# Patient Record
Sex: FEMALE | Race: BLACK OR AFRICAN AMERICAN | Employment: UNEMPLOYED | URBAN - METROPOLITAN AREA
[De-identification: names, ages, dates, MRNs, and addresses within clinical notes are randomized per-mention and may not be internally consistent; named-entity substitution may affect disease eponyms.]

---

## 2017-01-16 ENCOUNTER — OFFICE VISIT (OUTPATIENT)
Dept: FAMILY MEDICINE | Facility: CLINIC | Age: 62
End: 2017-01-16
Payer: COMMERCIAL

## 2017-01-16 VITALS
DIASTOLIC BLOOD PRESSURE: 82 MMHG | HEIGHT: 66 IN | HEART RATE: 75 BPM | BODY MASS INDEX: 25.78 KG/M2 | TEMPERATURE: 97.1 F | WEIGHT: 160.4 LBS | OXYGEN SATURATION: 100 % | SYSTOLIC BLOOD PRESSURE: 130 MMHG

## 2017-01-16 DIAGNOSIS — H53.9 VISUAL DISTURBANCE: ICD-10-CM

## 2017-01-16 DIAGNOSIS — J06.9 UPPER RESPIRATORY TRACT INFECTION, UNSPECIFIED TYPE: Primary | ICD-10-CM

## 2017-01-16 DIAGNOSIS — I10 HYPERTENSION GOAL BP (BLOOD PRESSURE) < 150/90: ICD-10-CM

## 2017-01-16 DIAGNOSIS — Z12.31 VISIT FOR SCREENING MAMMOGRAM: ICD-10-CM

## 2017-01-16 DIAGNOSIS — E55.9 VITAMIN D DEFICIENCY: ICD-10-CM

## 2017-01-16 DIAGNOSIS — R31.29 MICROSCOPIC HEMATURIA: ICD-10-CM

## 2017-01-16 DIAGNOSIS — R68.89 HEAT INTOLERANCE: ICD-10-CM

## 2017-01-16 DIAGNOSIS — M17.0 PRIMARY OSTEOARTHRITIS OF BOTH KNEES: ICD-10-CM

## 2017-01-16 LAB
ALBUMIN UR-MCNC: NEGATIVE MG/DL
ANION GAP SERPL CALCULATED.3IONS-SCNC: 11 MMOL/L (ref 3–14)
APPEARANCE UR: CLEAR
BACTERIA #/AREA URNS HPF: ABNORMAL /HPF
BASOPHILS # BLD AUTO: 0 10E9/L (ref 0–0.2)
BASOPHILS NFR BLD AUTO: 0.4 %
BILIRUB UR QL STRIP: NEGATIVE
BUN SERPL-MCNC: 15 MG/DL (ref 7–30)
CALCIUM SERPL-MCNC: 8.7 MG/DL (ref 8.5–10.1)
CHLORIDE SERPL-SCNC: 108 MMOL/L (ref 94–109)
CO2 SERPL-SCNC: 23 MMOL/L (ref 20–32)
COLOR UR AUTO: YELLOW
CREAT SERPL-MCNC: 0.61 MG/DL (ref 0.52–1.04)
DIFFERENTIAL METHOD BLD: NORMAL
EOSINOPHIL # BLD AUTO: 0.3 10E9/L (ref 0–0.7)
EOSINOPHIL NFR BLD AUTO: 5.1 %
ERYTHROCYTE [DISTWIDTH] IN BLOOD BY AUTOMATED COUNT: 12.7 % (ref 10–15)
GFR SERPL CREATININE-BSD FRML MDRD: ABNORMAL ML/MIN/1.7M2
GLUCOSE SERPL-MCNC: 102 MG/DL (ref 70–99)
GLUCOSE UR STRIP-MCNC: NEGATIVE MG/DL
HCT VFR BLD AUTO: 41.9 % (ref 35–47)
HGB BLD-MCNC: 13.4 G/DL (ref 11.7–15.7)
HGB UR QL STRIP: ABNORMAL
KETONES UR STRIP-MCNC: NEGATIVE MG/DL
LEUKOCYTE ESTERASE UR QL STRIP: NEGATIVE
LYMPHOCYTES # BLD AUTO: 2 10E9/L (ref 0.8–5.3)
LYMPHOCYTES NFR BLD AUTO: 37.5 %
MCH RBC QN AUTO: 29.6 PG (ref 26.5–33)
MCHC RBC AUTO-ENTMCNC: 32 G/DL (ref 31.5–36.5)
MCV RBC AUTO: 93 FL (ref 78–100)
MONOCYTES # BLD AUTO: 0.4 10E9/L (ref 0–1.3)
MONOCYTES NFR BLD AUTO: 7.5 %
NEUTROPHILS # BLD AUTO: 2.6 10E9/L (ref 1.6–8.3)
NEUTROPHILS NFR BLD AUTO: 49.5 %
NITRATE UR QL: NEGATIVE
NON-SQ EPI CELLS #/AREA URNS LPF: ABNORMAL /LPF
PH UR STRIP: 7 PH (ref 5–7)
PLATELET # BLD AUTO: 259 10E9/L (ref 150–450)
POTASSIUM SERPL-SCNC: 4.2 MMOL/L (ref 3.4–5.3)
RBC # BLD AUTO: 4.52 10E12/L (ref 3.8–5.2)
RBC #/AREA URNS AUTO: ABNORMAL /HPF (ref 0–2)
SODIUM SERPL-SCNC: 142 MMOL/L (ref 133–144)
SP GR UR STRIP: 1.01 (ref 1–1.03)
TSH SERPL DL<=0.005 MIU/L-ACNC: 2.61 MU/L (ref 0.4–4)
URN SPEC COLLECT METH UR: ABNORMAL
UROBILINOGEN UR STRIP-ACNC: 0.2 EU/DL (ref 0.2–1)
WBC # BLD AUTO: 5.3 10E9/L (ref 4–11)
WBC #/AREA URNS AUTO: ABNORMAL /HPF (ref 0–2)

## 2017-01-16 PROCEDURE — 36415 COLL VENOUS BLD VENIPUNCTURE: CPT | Performed by: FAMILY MEDICINE

## 2017-01-16 PROCEDURE — 99214 OFFICE O/P EST MOD 30 MIN: CPT | Performed by: FAMILY MEDICINE

## 2017-01-16 PROCEDURE — 85025 COMPLETE CBC W/AUTO DIFF WBC: CPT | Performed by: FAMILY MEDICINE

## 2017-01-16 PROCEDURE — 82306 VITAMIN D 25 HYDROXY: CPT | Performed by: FAMILY MEDICINE

## 2017-01-16 PROCEDURE — 80048 BASIC METABOLIC PNL TOTAL CA: CPT | Performed by: FAMILY MEDICINE

## 2017-01-16 PROCEDURE — 84443 ASSAY THYROID STIM HORMONE: CPT | Performed by: FAMILY MEDICINE

## 2017-01-16 PROCEDURE — 81001 URINALYSIS AUTO W/SCOPE: CPT | Performed by: FAMILY MEDICINE

## 2017-01-16 RX ORDER — HYDROCHLOROTHIAZIDE 25 MG/1
TABLET ORAL
Qty: 90 TABLET | Refills: 3 | Status: SHIPPED | OUTPATIENT
Start: 2017-01-16

## 2017-01-16 RX ORDER — ACETAMINOPHEN 500 MG
500 TABLET ORAL EVERY 6 HOURS PRN
Qty: 100 TABLET | Refills: 3 | Status: SHIPPED | OUTPATIENT
Start: 2017-01-16 | End: 2020-03-30

## 2017-01-16 NOTE — PATIENT INSTRUCTIONS
Call the imaging center at [phone] or  [phone] to schedule your CT.    [facility]    If you have any questions regarding to your visit please contact your care team:       Team Red:   Clinic Hours Telephone Number   Dr. Tayla Walden, NP   7am-7pm  Monday - Thursday   7am-5pm  Fridays  [phone]  (Appointment scheduling available 24/7)    Questions about your visit?   Team Line  [phone]   Urgent [facility] - [place] - 11am-9pm Monday-Friday Saturday-Sunday- 9am-5pm   [place] - 5pm-9pm Monday-Friday Saturday-Sunday- 9am-5pm  [phone] - [facility]  [phone] - [place]       What options do I have for visits at the clinic other than the traditional office visit?  To expand how we care for you, many of our providers are utilizing electronic visits (e-visits) and telephone visits, when medically appropriate, for interactions with their patients rather than a visit in the clinic.   We also offer nurse visits for many medical concerns. Just like any other service, we will bill your insurance company for this type of visit based on time spent on the phone with your provider. Not all insurance companies cover these visits. Please check with your medical insurance if this type of visit is covered. You will be responsible for any charges that are not paid by your insurance.      E-visits via [employer]:  generally incur a $35.00 fee.  Telephone visits:  Time spent on the phone: *charged based on time that is spent on the phone in increments of 10 minutes. Estimated cost:   5-10 mins $30.00   11-20 mins. $59.00   21-30 mins. $85.00     Use [employer] (secure email communication and access to your chart) to send your primary care provider a message or make an appointment. Ask someone on your Team how to sign up for [employer].  For a Price Quote for your services, please call our Consumer Price Line at  [phone].      As always, Thank you for trusting us with your health care needs!  Rigo Orosco

## 2017-01-16 NOTE — PROGRESS NOTES
SUBJECTIVE:                                                    Isa Stanton is a 62 year old female who presents to clinic today with her daughter for the following health issues:    Musculoskeletal problem/pain      Duration: comes and goes - greater than 1 year    Description  Location: bilateral knee pain and clicking    Intensity:  moderate    Accompanying signs and symptoms: none    History  Previous similar problem: YES  Previous evaluation:  x-ray    Precipitating or alleviating factors:  Trauma or overuse: no   Aggravating factors include: overuse, cold    Therapies tried and outcome: nothing     Eye Problem      Duration: gotten worse x months, unable to give me a date    Description (location/character/radiation): states she feels likes her eye sight has worsened    Intensity:  moderate    Accompanying signs and symptoms: headaches    History (similar episodes/previous evaluation): None    Precipitating or alleviating factors: light     Therapies tried and outcome: None     The patient complains of typical URI symptoms; coryza, nasal stuffiness, congestion, postnasal drip, sore throat, malaise, and dry cough, non-productive. She has sweats without fever or chills.     Hypertension well controlled on current medications without side effects, chest pain, or dyspnea. She would like her vitamins tested due to poor appetite and nutrition.     I have reviewed the patient's medical history in detail and updated the computerized patient record.     Due to language barrier, an  was present during the history-taking and subsequent discussion (and for part of the physical exam) with this patient.     ROS:  CONSTITUTIONAL: see above   I: NEGATIVE for worrisome rashes, moles or lesions  EYES: as above   ENT/MOUTH: as above   RESP:as above  CV: NEGATIVE for chest pain, palpitations or peripheral edema  GI: RUQ pain radiates to flank, off and on for several weeks   : NEGATIVE for frequency, dysuria, or  "hematuria  MUSCULOSKELETAL: see above   N: NEGATIVE for weakness, dizziness or paresthesias  ENDOCRINE: see above     OBJECTIVE:                                                    /82 mmHg  Pulse 75  Temp(Src) 97.1  F (36.2  C) (Oral)  Ht 5' 6\" (1.676 m)  Wt 160 lb 6.4 oz (72.757 kg)  BMI 25.90 kg/m2  SpO2 100%  Body mass index is 25.9 kg/(m^2).  GENERAL: alert and no distress  EYES: Eyes grossly normal to inspection, PERRL and conjunctivae and sclerae normal  HENT: ear canals and TM's normal, nose and mouth without ulcers or lesions  NECK: no adenopathy, no asymmetry, masses, or scars and thyroid normal to palpation  RESP: lungs clear to auscultation - no rales, rhonchi or wheezes  CV: regular rate and rhythm, normal S1 S2, no S3 or S4, no murmur, click or rub, no peripheral edema and peripheral pulses strong  ABDOMEN: soft, nontender, no hepatosplenomegaly, no masses and bowel sounds normal  MS: no gross musculoskeletal defects noted, no edema  PSYCH: mentation appears normal, affect normal/bright    Diagnostic Test Results:  Results for orders placed or performed in visit on 01/16/17   CBC with platelets differential   Result Value Ref Range    WBC 5.3 4.0 - 11.0 10e9/L    RBC Count 4.52 3.8 - 5.2 10e12/L    Hemoglobin 13.4 11.7 - 15.7 g/dL    Hematocrit 41.9 35.0 - 47.0 %    MCV 93 78 - 100 fl    MCH 29.6 26.5 - 33.0 pg    MCHC 32.0 31.5 - 36.5 g/dL    RDW 12.7 10.0 - 15.0 %    Platelet Count 259 150 - 450 10e9/L    Diff Method Automated Method     % Neutrophils 49.5 %    % Lymphocytes 37.5 %    % Monocytes 7.5 %    % Eosinophils 5.1 %    % Basophils 0.4 %    Absolute Neutrophil 2.6 1.6 - 8.3 10e9/L    Absolute Lymphocytes 2.0 0.8 - 5.3 10e9/L    Absolute Monocytes 0.4 0.0 - 1.3 10e9/L    Absolute Eosinophils 0.3 0.0 - 0.7 10e9/L    Absolute Basophils 0.0 0.0 - 0.2 10e9/L   UA reflex to Microscopic and Culture   Result Value Ref Range    Color Urine Yellow     Appearance Urine Clear     Glucose Urine " Negative NEG mg/dL    Bilirubin Urine Negative NEG    Ketones Urine Negative NEG mg/dL    Specific Gravity Urine 1.015 1.003 - 1.035    Blood Urine Trace (A) NEG    pH Urine 7.0 5.0 - 7.0 pH    Protein Albumin Urine Negative NEG mg/dL    Urobilinogen Urine 0.2 0.2 - 1.0 EU/dL    Nitrite Urine Negative NEG    Leukocyte Esterase Urine Negative NEG    Source Midstream Urine    Urine Microscopic   Result Value Ref Range    WBC Urine O - 2 0 - 2 /HPF    RBC Urine 2-5 (A) 0 - 2 /HPF    Squamous Epithelial /LPF Urine Moderate (A) FEW /LPF    Bacteria Urine Moderate (A) NEG /HPF        ASSESSMENT/PLAN:                                                    (J06.9) Upper respiratory tract infection, unspecified type  (primary encounter diagnosis)  Comment: educated how to take temperature at home   Plan: CBC with platelets differential, Urine         Microscopic        Symptomatic care.  Return to clinic for persistence, recurrence or new symptoms.     (M17.0) Primary osteoarthritis of both knees  Comment: mild by x-rays last year   Plan: acetaminophen (TYLENOL) 500 MG tablet        We discussed treatment options including tylenol as needed or orthopedic surgery referral.     (R68.89) Heat intolerance  Plan: CBC with platelets differential, TSH with free         T4 reflex        Reassurance provided.  Return to clinic for persistence, recurrence, or new symptoms as needed.     (H53.9) Visual disturbance  Plan: BASIC METABOLIC PANEL, OPHTHALMOLOGY ADULT         REFERRAL          (I10) Hypertension goal BP (blood pressure) < 150/90  Comment: Well controlled with medications without side effects.   Plan: BASIC METABOLIC PANEL, hydrochlorothiazide         (HYDRODIURIL) 25 MG tablet, TSH with free T4         reflex          (E55.9) Vitamin D deficiency  Plan: Vitamin D Deficiency        Resume supplement     (R31.29) Microscopic hematuria  Plan: UA reflex to Microscopic and Culture        CT per open orders     (Z12.31) Visit for  screening mammogram  Plan: MA SCREENING DIGITAL BILAT - Future  (s+30)            See Patient Instructions    Tayla Elena MD  [facility]

## 2017-01-16 NOTE — NURSING NOTE
"Chief Complaint   Patient presents with     Knee Pain     Eye Problem       Initial /82 mmHg  Pulse 75  Temp(Src) 97.1  F (36.2  C) (Oral)  Ht 5' 6\" (1.676 m)  Wt 160 lb 6.4 oz (72.757 kg)  BMI 25.90 kg/m2  SpO2 100% Estimated body mass index is 25.9 kg/(m^2) as calculated from the following:    Height as of this encounter: 5' 6\" (1.676 m).    Weight as of this encounter: 160 lb 6.4 oz (72.757 kg).  BP completed using cuff size: regular left arm    Kasey Gallego MA      "

## 2017-01-17 ENCOUNTER — TELEPHONE (OUTPATIENT)
Dept: FAMILY MEDICINE | Facility: CLINIC | Age: 62
End: 2017-01-17

## 2017-01-17 LAB — DEPRECATED CALCIDIOL+CALCIFEROL SERPL-MC: 13 UG/L (ref 20–75)

## 2017-01-17 RX ORDER — ERGOCALCIFEROL 1.25 MG/1
50000 CAPSULE, LIQUID FILLED ORAL
Qty: 8 CAPSULE | Refills: 0 | Status: SHIPPED
Start: 2017-01-17 | End: 2017-03-08

## 2017-01-17 NOTE — PROGRESS NOTES
Quick Note:    Please call patient via :  Your vitamin D level is low. Take Vitamin D 50,000 units weekly for 8 weeks (prescription has been sent to your pharmacy), then take over-the-counter vitamin D 2000 units daily indefinitely.     You have a small amount of blood in your urine. You do not have a urinary tract infection. Call the imaging center at [phone] or [phone] to schedule your CT.    Your thyroid, blood glucose, kidney, and anemia tests are normal. If you are not eating fruits and vegetables, take an over-the-counter multivitamin daily.     Tayla Elena MD    ______

## 2017-01-17 NOTE — TELEPHONE ENCOUNTER
Reason for Call: Request for an order or referral:    Order or referral being requested: Juan requesting a new order for CT scan for patient as the one ordered back in 2016  2016.    Date needed: as soon as possible    Has the patient been seen by the PCP for this problem? YES    Additional comments: Na  Phone number Patient can be reached at:  Home number on file [phone] (home) or Other phone number:      Best Time:  anytime    Can we leave a detailed message on this number?  YES    Call taken on 2017 at 4:44 PM by Casandra Villafuerte

## 2017-01-24 ENCOUNTER — OFFICE VISIT (OUTPATIENT)
Dept: OPHTHALMOLOGY | Facility: CLINIC | Age: 62
End: 2017-01-24
Payer: COMMERCIAL

## 2017-01-24 ENCOUNTER — RADIANT APPOINTMENT (OUTPATIENT)
Dept: MAMMOGRAPHY | Facility: CLINIC | Age: 62
End: 2017-01-24
Attending: FAMILY MEDICINE
Payer: COMMERCIAL

## 2017-01-24 DIAGNOSIS — Z12.31 VISIT FOR SCREENING MAMMOGRAM: ICD-10-CM

## 2017-01-24 DIAGNOSIS — H25.813 COMBINED FORM OF AGE-RELATED CATARACT, BOTH EYES: Primary | ICD-10-CM

## 2017-01-24 DIAGNOSIS — H02.834 DERMATOCHALASIS OF BOTH UPPER EYELIDS: ICD-10-CM

## 2017-01-24 DIAGNOSIS — H11.002 PTERYGIUM OF EYE, LEFT: ICD-10-CM

## 2017-01-24 DIAGNOSIS — H02.831 DERMATOCHALASIS OF BOTH UPPER EYELIDS: ICD-10-CM

## 2017-01-24 DIAGNOSIS — H52.4 PRESBYOPIA: ICD-10-CM

## 2017-01-24 PROCEDURE — 92015 DETERMINE REFRACTIVE STATE: CPT | Performed by: STUDENT IN AN ORGANIZED HEALTH CARE EDUCATION/TRAINING PROGRAM

## 2017-01-24 PROCEDURE — G0202 SCR MAMMO BI INCL CAD: HCPCS | Mod: TC

## 2017-01-24 PROCEDURE — 92004 COMPRE OPH EXAM NEW PT 1/>: CPT | Performed by: STUDENT IN AN ORGANIZED HEALTH CARE EDUCATION/TRAINING PROGRAM

## 2017-01-24 ASSESSMENT — REFRACTION_MANIFEST
OD_CYLINDER: SPHERE
OS_SPHERE: PLANO
OD_ADD: +3.00
OD_SPHERE: +0.25
OS_CYLINDER: SPHERE
OS_ADD: +3.00

## 2017-01-24 ASSESSMENT — VISUAL ACUITY
OD_SC: 20/50
OS_SC: 20/50

## 2017-01-24 ASSESSMENT — TONOMETRY
OD_IOP_MMHG: 17
IOP_METHOD: APPLANATION
OS_IOP_MMHG: 18

## 2017-01-24 ASSESSMENT — SLIT LAMP EXAM - LIDS
COMMENTS: 2+ DERMATOCHALASIS
COMMENTS: 2+ DERMATOCHALASIS

## 2017-01-24 ASSESSMENT — EXTERNAL EXAM - RIGHT EYE: OD_EXAM: NORMAL

## 2017-01-24 ASSESSMENT — CUP TO DISC RATIO
OD_RATIO: 0.1
OS_RATIO: 0.1

## 2017-01-24 ASSESSMENT — EXTERNAL EXAM - LEFT EYE: OS_EXAM: NORMAL

## 2017-01-24 NOTE — PROGRESS NOTES
" Current Eye Medications:  None.     Subjective:  Comprehensive Eye Exam.  She feels like there's something in her eyes intermittently.   Her eyes hurt if she looks at the TV, or any other screens, for any length of time.  Her vision is variable.  She does not have glasses.  When she attempting to focus, she feels increased pressure in her eyes.    No history of eye injuries or surgery.    Patient is here with her daughter to help interpret.      Objective:  See Ophthalmology Exam.       Assessment:  Isa Stanton is a 62 year old female who presents with:   Encounter Diagnoses   Name Primary?     Combined form of age-related cataract, both eyes Mild, vision not bothersome.      Pterygium of eye, left Low, asymptomatic     Dermatochalasis of both upper eyelids        Plan:  Use artificial tears up to 4 times per day (Refresh Plus, Systane Balance, or generic artificial tears are ok. Avoid \"get the red out\" drops).   Could try a +3.00 over the counter reader.    Juan Miguel Mancia MD  [phone]        "

## 2017-01-24 NOTE — MR AVS SNAPSHOT
"              After Visit Summary   1/24/2017    Isa Stanton    MRN: [id number]           Patient Information     Date Of Birth          1955        Visit Information        Provider Department      1/24/2017 1:30 PM Juan Miguel Mancia MD [facility]        Today's Diagnoses     Combined form of age-related cataract, both eyes    -  1     Pterygium of eye, left         Dermatochalasis of both upper eyelids         Presbyopia           Care Instructions    Use artificial tears up to 4 times per day (Refresh Plus, Systane Balance, or generic artificial tears are ok. Avoid \"get the red out\" drops).   Could try a +3.00 over the counter reader.    Juan Miguel Mancia MD  [phone]          Follow-ups after your visit        Follow-up notes from your care team     Return in about 1 year (around 1/24/2018) for Complete Exam.      Your next 10 appointments already scheduled     Jan 31, 2017 11:00 AM   CT ABDOMEN PELVIS HEMATURIA W/O & W CONTRAST with BECT1   [facility]    [address] [address] [address] [phone]           Please bring any scans or X-rays taken at other hospitals, if similar tests were done. Also bring a list of your medicines, including vitamins, minerals and over-the-counter drugs. It is safest to leave personal items at home.  Be sure to tell your doctor:   If you have any allergies.   If there s any chance you are pregnant.   If you are breastfeeding.   If you have any special needs.  You may have contrast for this exam. To prepare:   Do not eat or drink for 2 hours before your exam. If you need to take medicine, you may take it with small sips of water. (We may ask you to take liquid medicine as well.)   The day before your exam, drink extra fluids at least six 8-ounce glasses (unless your doctor tells you to restrict your fluids).  Patients over 70 or patients with diabetes or kidney problems:   If you haven t had a " blood test (creatinine test) within the last 30 days, go to your clinic or Diagnostic Imaging Department for this test.  If you have diabetes:   If your kidney function is normal, continue taking your metformin (Avandamet, Glucophage, Glucovance, Metaglip) on the day of your exam.   If your kidney function is abnormal, wait 48 hours before restarting this medicine.  You will have oral contrast for this exam:   You will drink the contrast at home. Get this from your clinic or Diagnostic Imaging Department. Please follow the directions given.  Please wear loose clothing, such as a sweat suit or jogging clothes. Avoid snaps, zippers and other metal. We may ask you to undress and put on a hospital gown.  If you have any questions, please call the Imaging Department where you will have your exam.              Future tests that were ordered for you today     Open Future Orders        Priority Expected Expires Ordered    US Breast Right Routine  1/24/2018 1/24/2017    MA Diagnostic Digital Right Routine  1/24/2018 1/24/2017            Who to contact     If you have questions or need follow up information about today's clinic visit or your schedule please contact [facility] directly at [phone].  Normal or non-critical lab and imaging results will be communicated to you by MyChart, letter or phone within 4 business days after the clinic has received the results. If you do not hear from us within 7 days, please contact the clinic through MyChart or phone. If you have a critical or abnormal lab result, we will notify you by phone as soon as possible.  Submit refill requests through [employer] or call your pharmacy and they will forward the refill request to us. Please allow 3 business days for your refill to be completed.          Additional Information About Your Visit        Care EveryWhere ID     This is your Care EveryWhere ID. This could be used by other organizations to access your [facility]  records  [id number]         Blood Pressure from Last 3 Encounters:   01/16/17 130/82   09/27/16 138/80   06/15/16 134/88    Weight from Last 3 Encounters:   01/16/17 72.757 kg (160 lb 6.4 oz)   09/27/16 73.483 kg (162 lb)   06/15/16 72.757 kg (160 lb 6.4 oz)              We Performed the Following     EYE EXAM (SIMPLE-NONBILLABLE)     REFRACTIVE STATUS        Primary Care Provider Office Phone # Fax #    Tayla Elena [phone]       [address] [address]        Thank you!     Thank you for choosing [facility]  for your care. Our goal is always to provide you with excellent care. Hearing back from our patients is one way we can continue to improve our services. Please take a few minutes to complete the written survey that you may receive in the mail after your visit with us. Thank you!             Your Updated Medication List - Protect others around you: Learn how to safely use, store and throw away your medicines at www.disposemymeds.org.          This list is accurate as of: 1/24/17  2:53 PM.  Always use your most recent med list.                   Brand Name Dispense Instructions for use    acetaminophen 500 MG tablet    TYLENOL    100 tablet    Take 1 tablet (500 mg) by mouth every 6 hours as needed for pain       hydrochlorothiazide 25 MG tablet    HYDRODIURIL    90 tablet    TAKE 1 TABLET BY MOUTH DAILY FOR BLOOD PRESSURE       vitamin D 2000 UNITS tablet      Take 2,000 Units by mouth daily       vitamin D 04939 UNIT capsule    ERGOCALCIFEROL    8 capsule    Take 1 capsule (50,000 Units) by mouth every 7 days for 8 doses . Then, take over-the-counter vitamin D 2000 units daily indefinitely (no refills)

## 2017-01-24 NOTE — PATIENT INSTRUCTIONS
"Use artificial tears up to 4 times per day (Refresh Plus, Systane Balance, or generic artificial tears are ok. Avoid \"get the red out\" drops).   Could try a +3.00 over the counter reader.    Juan Miguel Mancia MD  [phone]    "

## 2017-02-01 ENCOUNTER — RADIANT APPOINTMENT (OUTPATIENT)
Dept: MAMMOGRAPHY | Facility: CLINIC | Age: 62
End: 2017-02-01
Attending: FAMILY MEDICINE
Payer: COMMERCIAL

## 2017-02-01 ENCOUNTER — RADIANT APPOINTMENT (OUTPATIENT)
Dept: CT IMAGING | Facility: CLINIC | Age: 62
End: 2017-02-01
Attending: FAMILY MEDICINE
Payer: COMMERCIAL

## 2017-02-01 DIAGNOSIS — N83.201 RIGHT OVARIAN CYST: Primary | ICD-10-CM

## 2017-02-01 DIAGNOSIS — G89.29 CHRONIC ABDOMINAL PAIN: ICD-10-CM

## 2017-02-01 DIAGNOSIS — R92.8 ABNORMAL MAMMOGRAM: ICD-10-CM

## 2017-02-01 DIAGNOSIS — R31.29 MICROSCOPIC HEMATURIA: ICD-10-CM

## 2017-02-01 DIAGNOSIS — R10.9 CHRONIC ABDOMINAL PAIN: ICD-10-CM

## 2017-02-01 PROCEDURE — G0206 DX MAMMO INCL CAD UNI: HCPCS | Mod: RT

## 2017-02-01 PROCEDURE — 74178 CT ABD&PLV WO CNTR FLWD CNTR: CPT | Performed by: RADIOLOGY

## 2017-02-01 RX ORDER — IOPAMIDOL 755 MG/ML
98 INJECTION, SOLUTION INTRAVASCULAR ONCE
Status: COMPLETED | OUTPATIENT
Start: 2017-02-01 | End: 2017-02-01

## 2017-02-01 RX ADMIN — IOPAMIDOL 98 ML: 755 INJECTION, SOLUTION INTRAVASCULAR at 15:40

## 2017-02-03 ENCOUNTER — TELEPHONE (OUTPATIENT)
Dept: FAMILY MEDICINE | Facility: CLINIC | Age: 62
End: 2017-02-03

## 2017-02-03 PROBLEM — N83.201 RIGHT OVARIAN CYST: Status: ACTIVE | Noted: 2017-02-03

## 2017-02-03 NOTE — PROGRESS NOTES
Quick Note:    Please call patient:  No cause for blood in your urine was found. Please see our urologist about this. Call our appointment line at [phone] or go to [email or web address].     You have a right ovary cyst. Call the imaging center at [phone] or [phone] to schedule an ultrasound for further evaluation.    Tayla Elena MD    ______

## 2017-02-10 ENCOUNTER — TELEPHONE (OUTPATIENT)
Dept: FAMILY MEDICINE | Facility: CLINIC | Age: 62
End: 2017-02-10

## 2017-02-10 ENCOUNTER — RADIANT APPOINTMENT (OUTPATIENT)
Dept: ULTRASOUND IMAGING | Facility: CLINIC | Age: 62
End: 2017-02-10
Attending: FAMILY MEDICINE
Payer: COMMERCIAL

## 2017-02-10 DIAGNOSIS — N83.201 RIGHT OVARIAN CYST: ICD-10-CM

## 2017-02-10 PROCEDURE — 76856 US EXAM PELVIC COMPLETE: CPT

## 2017-02-10 PROCEDURE — 76830 TRANSVAGINAL US NON-OB: CPT

## 2017-02-10 NOTE — LETTER
[facility]  [address] [place] [address]    February 13, 2017    [address] [facility] [place] [address]          Dear Victoria Moss is a copy of your results.  Your ultrasound shows a benign ovary cyst. A follow-up ultrasound is recommended in 2 months. Call the imaging center at [phone] or  [phone] to schedule your ultrasound.    Results for orders placed or performed in visit on 02/10/17   US Pelvic Complete with Transvaginal    Narrative    ULTRASOUND  PELVIC COMPLETE WITH TRANSVAGINAL IMAGING  2/10/2017 3:13  PM     HISTORY: Unspecified ovarian cyst, right side.     FINDINGS:  Transvaginal images were performed to better evaluate the  patient's uterus, ovaries and endometrial stripe.    The uterus is normal in size measuring 6.4 x 3.0 x 3.9 cm. No fibroids  are evident. Endometrial stripe measures 3 mm and is normal for  patient's age. Trace amount of fluid is noted in the endometrial  cavity. The right ovary measures 4.4 x 3.4 x 2.6 cm and contains a  simple-appearing cyst measuring 3.1 x 2.0 x 3.9 cm. The left ovary is  not visualized. Normal color Doppler flow is noted in the right ovary.  No color flow is noted in the cyst. No adnexal masses are present. No  free pelvic fluid is present.      Impression    IMPRESSION:   1. Simple-appearing right ovarian cyst. No wall thickening, nodularity  or color Doppler flow. Follow-up ultrasound in 2 or 3 months could be  performed to assess for interval resolution.  2. Trace amount of fluid in the endometrial cavity. No significant  endometrial thickening.    AUBRIE MALDONADO MD       If you have any questions or concerns, please call myself or my nurse at [phone].      Sincerely,    Tayla Elena MD, dr

## 2017-02-10 NOTE — TELEPHONE ENCOUNTER
Reason for Call:  Other     Detailed comments: pt had appt today.  Came with daughter.  Daughter wanted to know why we called last week. Please call daughter @ [phone]    Phone Number Patient can be reached at: Other phone number:  [phone]    Best Time: any    Can we leave a detailed message on this number? Daughter would rather speak to a person  Call taken on 2/10/2017 at 2:30 PM by Graciela Desai

## 2017-02-10 NOTE — TELEPHONE ENCOUNTER
Patient ok for writer to speak with daughter Juan.  RN notified Juan of the provider's message as it's written below.  Juan states they just did the US today and they will follow up with urology per recommendation.   No further concerns voiced by patient's daughter at this time.    ------    Notes Recorded by Tayla Elena MD on 2/3/2017 at 10:04 AM  Please call patient:  No cause for blood in your urine was found. Please see our urologist about this. Call our appointment line at [phone] or go to [email or web address].     You have a right ovary cyst. Call the imaging center at [phone] or  [phone] to schedule an ultrasound for further evaluation.    Tayla JOSEPH, RN, BSN

## 2017-02-13 NOTE — PROGRESS NOTES
Mail letter:  Your ultrasound shows a benign ovary cyst. A follow-up ultrasound is recommended in 2 months. Call the imaging center at [phone] or  [phone] to schedule your ultrasound.  Tayla Elena MD

## 2017-02-21 ENCOUNTER — OFFICE VISIT (OUTPATIENT)
Dept: UROLOGY | Facility: CLINIC | Age: 62
End: 2017-02-21
Payer: COMMERCIAL

## 2017-02-21 ENCOUNTER — TELEPHONE (OUTPATIENT)
Dept: GENERAL RADIOLOGY | Facility: CLINIC | Age: 62
End: 2017-02-21

## 2017-02-21 VITALS — RESPIRATION RATE: 14 BRPM | SYSTOLIC BLOOD PRESSURE: 132 MMHG | HEART RATE: 72 BPM | DIASTOLIC BLOOD PRESSURE: 80 MMHG

## 2017-02-21 DIAGNOSIS — R31.29 MICROSCOPIC HEMATURIA: Primary | ICD-10-CM

## 2017-02-21 PROCEDURE — 88112 CYTOPATH CELL ENHANCE TECH: CPT | Performed by: UROLOGY

## 2017-02-21 PROCEDURE — 99243 OFF/OP CNSLTJ NEW/EST LOW 30: CPT | Performed by: UROLOGY

## 2017-02-21 NOTE — PROGRESS NOTES
Urology Note            S:  Isa Stanton is a 62 year old female who was seen in a consultation at the request of Dr. Elena for hematuria.   Patient has microscopic hematuria.  She has no other voiding symptoms in addition to hematuria.  She has no flank pain.  She has no history of kidney stone.  She denies any trauma.  Recent UA showed 2-5 recently and 10-25 rbc/hpf several months ago.  She was being evaluated for abdominal pain which has resolved.  CT scan was negative except ovarian cyst.  Info obtained through  and her daughter.  Past Medical History   Diagnosis Date     Chronic abdominal pain      H. pylori infection      HTN (hypertension)      Lung nodules      Osteoarthritis of knees, bilateral      Vitamin D deficiency      Current Outpatient Prescriptions   Medication Sig Dispense Refill     vitamin D (ERGOCALCIFEROL) 32133 UNIT capsule Take 1 capsule (50,000 Units) by mouth every 7 days for 8 doses . Then, take over-the-counter vitamin D 2000 units daily indefinitely (no refills) 8 capsule 0     hydrochlorothiazide (HYDRODIURIL) 25 MG tablet TAKE 1 TABLET BY MOUTH DAILY FOR BLOOD PRESSURE 90 tablet 3     acetaminophen (TYLENOL) 500 MG tablet Take 1 tablet (500 mg) by mouth every 6 hours as needed for pain 100 tablet 3     Cholecalciferol (VITAMIN D) 2000 UNITS tablet Take 2,000 Units by mouth daily       Past Surgical History   Procedure Laterality Date     No history of surgery        Social History     Social History     Marital status:      Spouse name: N/A     Number of children: 6     Years of education: 0     Occupational History      Homemaker     Social History Main Topics     Smoking status: Never Smoker     Smokeless tobacco: Never Used     Alcohol use No     Drug use: No     Sexual activity: Not Currently     Partners: Male     Birth control/ protection: Post-menopausal     Other Topics Concern     Not on file     Social History Narrative     Family History   Problem  Relation Age of Onset     DIABETES No family hx of      HEART DISEASE No family hx of      CANCER No family hx of           REVIEW OF SYSTEMS  =================  C: NEGATIVE for fever, chills, change in weight  I: NEGATIVE for worrisome rashes, moles or lesions  E/M: NEGATIVE for ear, mouth and throat problems  R: NEGATIVE for significant cough or SHORTNESS OF BREATH  CV:  NEGATIVE for chest pain, palpitations or peripheral edema  GI: NEGATIVE for nausea, abdominal pain, heartburn, or change in bowel habits  NEURO: NEGATIVE numbness/weakness  : see HPI  PSYCH: NEGATIVE depression/anxiety  LYmph: no new enlarged lymph nodes  Ortho: no new trauma/movements           O: Exam:  Constitutional: healthy, alert and no distress  Head: Normocephalic. No masses, lesions, tenderness or abnormalities  ENT: ENT exam normal, no neck nodes or sinus tenderness  Cardiovascular: negative, PMI normal.   Respiratory: negative, no evidence of respiratory distress  Gastrointestinal: Abdomen soft, non-tender. BS normal. No masses, organomegaly  : no cva tenderness  Musculoskeletal: extremities normal- no gross deformities noted, gait normal and normal muscle tone  Skin: no suspicious lesions or rashes  Neurologic: Alert and oriented  Psychiatric: mentation appears normal. and affect normal/bright  Hematologic/Lymphatic/Immunologic: normal ant/post cervical, axillary, supraclavicular and inguinal nodes    Assessment:  Hematuria, microscopic    Recommendation: Schedule patient for urine cytology/cysto next.

## 2017-02-21 NOTE — TELEPHONE ENCOUNTER
Called pt to reschedule breast biopsy. Pt was a no-show on 2/15/17. No answer. Left message with contact information for pt to return call.

## 2017-02-21 NOTE — PATIENT INSTRUCTIONS
Your next cystoscopy is scheduled 4/11/2017 @ 11:00 Please call  if you need to reschedule this appointment.      Cystoscopy  Cystoscopy is a procedure that lets your doctor look directly inside your urethra and bladder. It can be used to:    Help diagnose a problem with your urethra, bladder, or kidneys.    Take a sample (biopsy) of bladder or urethral tissue.    Treat certain problems (such as removing kidney stones).    Place a stent to bypass an obstruction.    Take special X-rays of the kidneys.  Based on the findings, your doctor may recommend other tests or treatments.  What is a cystoscope?  A cystoscope is a telescope-like instrument that contains lenses and fiberoptics (small glass wires that make bright light). The cystoscope may be straight and rigid, or flexible to bend around curves in the urethra. The doctor may look directly into the cystoscope, or project the image onto a monitor.  Getting ready    Ask your doctor if you should stop taking any medications prior to the procedure.    Ask whether you should avoid eating or drinking anything after midnight before the procedure.    Follow any other instructions your doctor gives you.  Tell your doctor before the exam if you:    Take any medications, such as aspirin or blood thinners    Have allergies to any medications    Are pregnant   The procedure  Cystoscopy is done in the doctor s office or hospital. The doctor and a nurse are present during the procedure. It takes only a few minutes, longer if a biopsy, X-ray, or treatment needs to be done.  During the procedure:    You lie on an exam table on your back, knees bent and legs apart. You are covered with a drape.    Your urethra and the area around it are washed. Anesthetic jelly may be applied to numb the urethra. Other pain medication is usually not needed. In some cases, you may be offered a mild sedative to help you relax. If a more extensive procedure is to be done, such as a biopsy  or kidney stone removal, general anesthesia may be needed.    The cystoscope is inserted. A sterile fluid is put into the bladder to expand it. You may feel pressure from this fluid.    When the procedure is done, the cystoscope is removed.  After the procedure  If you had a sedative, general anesthesia, or spinal anesthesia, you must have someone drive you home. Once you re home:    Drink plenty of fluids.    You may have burning or light bleeding when you urinate--this is normal.    Medications may be prescribed to ease any discomfort or prevent infection. Take these as directed.    Call your doctor if you have heavy bleeding or blood clots, burning that lasts more than a day, a fever over 100 F  (38  C), or trouble urinating.    6235-3805 [employer]. [address] [place] [address], [place] [address]. All rights reserved. This information is not intended as a substitute for professional medical care. Always follow your healthcare professional's instructions.

## 2017-02-21 NOTE — MR AVS SNAPSHOT
After Visit Summary   2/21/2017    Isa Stanton    MRN: [id number]           Patient Information     Date Of Birth          1955        Visit Information        Provider Department      2/21/2017 3:15 PM Dillon Matthews MD; [employer] [facility]        Today's Diagnoses     Microscopic hematuria    -  1      Care Instructions    Your next cystoscopy is scheduled 4/11/2017 @ 11:00 Please call  if you need to reschedule this appointment.      Cystoscopy  Cystoscopy is a procedure that lets your doctor look directly inside your urethra and bladder. It can be used to:    Help diagnose a problem with your urethra, bladder, or kidneys.    Take a sample (biopsy) of bladder or urethral tissue.    Treat certain problems (such as removing kidney stones).    Place a stent to bypass an obstruction.    Take special X-rays of the kidneys.  Based on the findings, your doctor may recommend other tests or treatments.  What is a cystoscope?  A cystoscope is a telescope-like instrument that contains lenses and fiberoptics (small glass wires that make bright light). The cystoscope may be straight and rigid, or flexible to bend around curves in the urethra. The doctor may look directly into the cystoscope, or project the image onto a monitor.  Getting ready    Ask your doctor if you should stop taking any medications prior to the procedure.    Ask whether you should avoid eating or drinking anything after midnight before the procedure.    Follow any other instructions your doctor gives you.  Tell your doctor before the exam if you:    Take any medications, such as aspirin or blood thinners    Have allergies to any medications    Are pregnant   The procedure  Cystoscopy is done in the doctor s office or hospital. The doctor and a nurse are present during the procedure. It takes only a few minutes, longer if a biopsy, X-ray, or treatment needs to be done.  During the  procedure:    You lie on an exam table on your back, knees bent and legs apart. You are covered with a drape.    Your urethra and the area around it are washed. Anesthetic jelly may be applied to numb the urethra. Other pain medication is usually not needed. In some cases, you may be offered a mild sedative to help you relax. If a more extensive procedure is to be done, such as a biopsy or kidney stone removal, general anesthesia may be needed.    The cystoscope is inserted. A sterile fluid is put into the bladder to expand it. You may feel pressure from this fluid.    When the procedure is done, the cystoscope is removed.  After the procedure  If you had a sedative, general anesthesia, or spinal anesthesia, you must have someone drive you home. Once you re home:    Drink plenty of fluids.    You may have burning or light bleeding when you urinate--this is normal.    Medications may be prescribed to ease any discomfort or prevent infection. Take these as directed.    Call your doctor if you have heavy bleeding or blood clots, burning that lasts more than a day, a fever over 100 F  (38  C), or trouble urinating.    1895-2296 [employer]. [address] [place] [address]. All rights reserved. This information is not intended as a substitute for professional medical care. Always follow your healthcare professional's instructions.              Follow-ups after your visit        Your next 10 appointments already scheduled     Apr 11, 2017 11:00 AM CDT   Return Visit with Dillon Matthews MD, [place] CYSTO PROC ROOM   [facility] [place] ([facility] [place]  [place] [address] [phone]              Who to contact     If you have questions or need follow up information about today's clinic visit or your schedule please contact [facility] [place] directly at [phone].  Normal or non-critical lab and imaging results will be communicated to you  by MyChart, letter or phone within 4 business days after the clinic has received the results. If you do not hear from us within 7 days, please contact the clinic through MyChart or phone. If you have a critical or abnormal lab result, we will notify you by phone as soon as possible.  Submit refill requests through [employer] or call your pharmacy and they will forward the refill request to us. Please allow 3 business days for your refill to be completed.          Additional Information About Your Visit        Care EveryWhere ID     This is your Care EveryWhere ID. This could be used by other organizations to access your [place] medical records  [id number]        Your Vitals Were     Pulse Respirations                72 14           Blood Pressure from Last 3 Encounters:   02/21/17 132/80   01/16/17 130/82   09/27/16 138/80    Weight from Last 3 Encounters:   01/16/17 72.8 kg (160 lb 6.4 oz)   09/27/16 73.5 kg (162 lb)   06/15/16 72.8 kg (160 lb 6.4 oz)              We Performed the Following     Cytology non gyn        Primary Care Provider Office Phone # Fax #    Tayla Elena [phone]       [address] [address]        Thank you!     Thank you for choosing [facility]  for your care. Our goal is always to provide you with excellent care. Hearing back from our patients is one way we can continue to improve our services. Please take a few minutes to complete the written survey that you may receive in the mail after your visit with us. Thank you!             Your Updated Medication List - Protect others around you: Learn how to safely use, store and throw away your medicines at www.disposemymeds.org.          This list is accurate as of: 2/21/17  3:51 PM.  Always use your most recent med list.                   Brand Name Dispense Instructions for use    acetaminophen 500 MG tablet    TYLENOL    100 tablet    Take 1 tablet (500 mg) by mouth  every 6 hours as needed for pain       hydrochlorothiazide 25 MG tablet    HYDRODIURIL    90 tablet    TAKE 1 TABLET BY MOUTH DAILY FOR BLOOD PRESSURE       vitamin D 2000 UNITS tablet      Take 2,000 Units by mouth daily       vitamin D 61358 UNIT capsule    ERGOCALCIFEROL    8 capsule    Take 1 capsule (50,000 Units) by mouth every 7 days for 8 doses . Then, take over-the-counter vitamin D 2000 units daily indefinitely (no refills)

## 2017-02-21 NOTE — NURSING NOTE
"Chief Complaint   Patient presents with     Consult     Dr Dodge       Initial /80 (BP Location: Right arm, Cuff Size: Adult Regular)  Pulse 72  Resp 14 Estimated body mass index is 25.89 kg/(m^2) as calculated from the following:    Height as of 1/16/17: 1.676 m (5' 6\").    Weight as of 1/16/17: 72.8 kg (160 lb 6.4 oz).  Medication Reconciliation: complete   Claribel Willett CMA      "

## 2017-02-23 LAB — COPATH REPORT: NORMAL

## 2017-02-24 NOTE — TELEPHONE ENCOUNTER
Second attempt to call pt to reschedule breast biopsy. No answer. Left message with contact information for pt to return call.

## 2017-03-01 NOTE — TELEPHONE ENCOUNTER
Spoke with patient's daughter regarding need to reschedule breast biopsy. Daughter stated that patient does not want to do the biopsy; she would prefer to wait until her mammogram next year to see if the area in question has increased in size. Discussed with daughter that the area is suspicious and biopsy is definitely recommended. However, if her mother is persistent in not wanting the biopsy, she should, at minimum, return for repeat imaging in 6 months. Daughter verbalized understanding and will discuss this with her mother.

## 2017-03-22 RX ORDER — ERGOCALCIFEROL 1.25 MG/1
CAPSULE, LIQUID FILLED ORAL
Start: 2017-03-22

## 2017-03-22 NOTE — TELEPHONE ENCOUNTER
vitamin D (ERGOCALCIFEROL) 88671 UNIT capsule   Completed 3/8/2017   Last Written Prescription Date:  1/17/2017  Last Fill Quantity: 8,   # refills: 0  Last Office Visit with G, P or [place] prescribing provider: 2/21/2017  Future Office visit:    Next 5 appointments (look out 90 days)     Apr 11, 2017 11:00 AM CDT   Return Visit with Dillon Matthews MD, [place] CYSTO PROC ROOM   [facility] [place] ([address]  [place] [address]   [phone]                   Routing refill request to provider for review/approval because:  Drug not active on patient's medication list

## 2017-04-11 ENCOUNTER — OFFICE VISIT (OUTPATIENT)
Dept: UROLOGY | Facility: CLINIC | Age: 62
End: 2017-04-11
Payer: COMMERCIAL

## 2017-04-11 VITALS — DIASTOLIC BLOOD PRESSURE: 87 MMHG | SYSTOLIC BLOOD PRESSURE: 132 MMHG | HEART RATE: 92 BPM | OXYGEN SATURATION: 99 %

## 2017-04-11 DIAGNOSIS — R31.29 MICROSCOPIC HEMATURIA: Primary | ICD-10-CM

## 2017-04-11 PROCEDURE — 52000 CYSTOURETHROSCOPY: CPT | Performed by: UROLOGY

## 2017-04-11 NOTE — NURSING NOTE
"Chief Complaint   Patient presents with     Cystoscopy       Initial /87 (BP Location: Left arm, Patient Position: Chair, Cuff Size: Adult Large)  Pulse 92  SpO2 99% Estimated body mass index is 25.89 kg/(m^2) as calculated from the following:    Height as of 1/16/17: 1.676 m (5' 6\").    Weight as of 1/16/17: 72.8 kg (160 lb 6.4 oz).  Medication Reconciliation: complete   Claribel Willett CMA      "

## 2017-04-11 NOTE — MR AVS SNAPSHOT
After Visit Summary   4/11/2017    Isa Stanton    MRN: [id number]           Patient Information     Date Of Birth          1955        Visit Information        Provider Department      4/11/2017 10:45 AM Dillon Matthews MD; [employer]; [facility] CYSTO PROC ROOM [facility]        Today's Diagnoses     Microscopic hematuria    -  1       Follow-ups after your visit        Your next 10 appointments already scheduled     Apr 17, 2017 10:40 AM CDT   Office Visit with Tayla Elena MD   [facility] ([facility])    [address] [address]   [phone]           Bring a current list of meds and any records pertaining to this visit.  For Physicals, please bring immunization records and any forms needing to be filled out.  Please arrive 10 minutes early to complete paperwork.              Who to contact     If you have questions or need follow up information about today's clinic visit or your schedule please contact [facility] directly at [phone].  Normal or non-critical lab and imaging results will be communicated to you by MyChart, letter or phone within 4 business days after the clinic has received the results. If you do not hear from us within 7 days, please contact the clinic through MyChart or phone. If you have a critical or abnormal lab result, we will notify you by phone as soon as possible.  Submit refill requests through [employer] or call your pharmacy and they will forward the refill request to us. Please allow 3 business days for your refill to be completed.          Additional Information About Your Visit        Care EveryWhere ID     This is your Care EveryWhere ID. This could be used by other organizations to access your [place] medical records  [id number]        Your Vitals Were     Pulse Pulse Oximetry                92 99%           Blood Pressure from Last 3 Encounters:   04/11/17  132/87   02/21/17 132/80   01/16/17 130/82    Weight from Last 3 Encounters:   01/16/17 72.8 kg (160 lb 6.4 oz)   09/27/16 73.5 kg (162 lb)   06/15/16 72.8 kg (160 lb 6.4 oz)              We Performed the Following     CYSTOURETHROSCOPY        Primary Care Provider Office Phone # Fax #    Tayla Elena [phone]       [address] [address]        Thank you!     Thank you for choosing [facility]  for your care. Our goal is always to provide you with excellent care. Hearing back from our patients is one way we can continue to improve our services. Please take a few minutes to complete the written survey that you may receive in the mail after your visit with us. Thank you!             Your Updated Medication List - Protect others around you: Learn how to safely use, store and throw away your medicines at www.disposemymeds.org.          This list is accurate as of: 4/11/17 11:35 AM.  Always use your most recent med list.                   Brand Name Dispense Instructions for use    acetaminophen 500 MG tablet    TYLENOL    100 tablet    Take 1 tablet (500 mg) by mouth every 6 hours as needed for pain       hydrochlorothiazide 25 MG tablet    HYDRODIURIL    90 tablet    TAKE 1 TABLET BY MOUTH DAILY FOR BLOOD PRESSURE       vitamin D 2000 UNITS tablet      Take 2,000 Units by mouth daily

## 2017-04-11 NOTE — PROGRESS NOTES
The patient is here for cystoscopy for evaluation of hematuria.     Patient is prepped and draped.  Flexible cystoscope placed under direct vision.      Cysto: The anterior urethra is normal     In the bladder there is normal mucosa.    Assessment/Plan:  (R31.29) Microscopic hematuria  (primary encounter diagnosis)  Comment:    Plan: neg urological evaluation

## 2017-04-12 DIAGNOSIS — E55.9 VITAMIN D DEFICIENCY: Primary | ICD-10-CM

## 2017-04-13 RX ORDER — ERGOCALCIFEROL 1.25 MG/1
CAPSULE, LIQUID FILLED ORAL
Start: 2017-04-13

## 2017-04-13 RX ORDER — CHOLECALCIFEROL (VITAMIN D3) 50 MCG
2000 TABLET ORAL DAILY
Qty: 90 TABLET | Refills: 3 | Status: SHIPPED | OUTPATIENT
Start: 2017-04-13 | End: 2017-04-17

## 2017-04-13 NOTE — TELEPHONE ENCOUNTER
Signed Prescriptions:                        Disp   Refills    Cholecalciferol (VITAMIN D) 2000 UNITS tab*90 tab*3        Sig: Take 2,000 Units by mouth daily  Authorizing Provider: KENN JAMESON    Refused Prescriptions:                       Disp   Refills    vitamin D (ERGOCALCIFEROL) 89781 UNIT caps*                Sig: TAKE 1 CAPSULE BY MOUTH EVERY 7 DAYS FOR 8 WEEKS  Refused By: KENN JAMESON  Reason for Refusal: Incorrect Dose  Reason for Refusal Comment: change to OTC 2000 units daily

## 2017-04-13 NOTE — TELEPHONE ENCOUNTER
Routing refill request to provider for review/approval because:  Drug not on the [facility] refill protocol     Mickie Salgado RN - BC

## 2017-04-13 NOTE — TELEPHONE ENCOUNTER
Cholecalciferol (VITAMIN D) 2000 UNITS tab  Last Written Prescription Date: unknown  Last Fill Quantity: 4,  # refills: 0   Last Office Visit with [facility], [facility] or [facility] prescribing provider: 1/16/17                                         Next 5 appointments (look out 90 days)     Apr 17, 2017 10:30 AM CDT   Office Visit with Tayla Elena MD, [employer]   [facility] ([facility])    [address]  [place] [address]   [phone]

## 2017-04-17 ENCOUNTER — OFFICE VISIT (OUTPATIENT)
Dept: FAMILY MEDICINE | Facility: CLINIC | Age: 62
End: 2017-04-17
Payer: COMMERCIAL

## 2017-04-17 VITALS
WEIGHT: 161 LBS | HEIGHT: 66 IN | SYSTOLIC BLOOD PRESSURE: 134 MMHG | HEART RATE: 77 BPM | BODY MASS INDEX: 25.88 KG/M2 | TEMPERATURE: 97.1 F | OXYGEN SATURATION: 100 % | DIASTOLIC BLOOD PRESSURE: 84 MMHG

## 2017-04-17 DIAGNOSIS — M54.2 NECK PAIN: Primary | ICD-10-CM

## 2017-04-17 DIAGNOSIS — N83.201 RIGHT OVARIAN CYST: ICD-10-CM

## 2017-04-17 PROCEDURE — 99213 OFFICE O/P EST LOW 20 MIN: CPT | Performed by: FAMILY MEDICINE

## 2017-04-17 RX ORDER — METHOCARBAMOL 750 MG/1
750 TABLET, FILM COATED ORAL 4 TIMES DAILY PRN
Qty: 30 TABLET | Refills: 1 | Status: SHIPPED | OUTPATIENT
Start: 2017-04-17 | End: 2018-04-25

## 2017-04-17 NOTE — PATIENT INSTRUCTIONS
Call the imaging center at [phone] or  [phone] to schedule your pelvic ultrasound to follow-up right ovary cyst.      [facility]    If you have any questions regarding to your visit please contact your care team:       Team Red:   Clinic Hours Telephone Number   Dr. Tayla Walden, NP   7am-7pm  Monday - Thursday   7am-5pm  Fridays  [phone]  (Appointment scheduling available 24/7)    Questions about your visit?   Team Line  [phone]   Urgent Care - [place] - 11am-9pm Monday-Friday Saturday-Sunday- 9am-5pm   [place] - 5pm-9pm Monday-Friday Saturday-Sunday- 9am-5pm  [phone] - [place]   [phone] - [place]       What options do I have for visits at the clinic other than the traditional office visit?  To expand how we care for you, many of our providers are utilizing electronic visits (e-visits) and telephone visits, when medically appropriate, for interactions with their patients rather than a visit in the clinic.   We also offer nurse visits for many medical concerns. Just like any other service, we will bill your insurance company for this type of visit based on time spent on the phone with your provider. Not all insurance companies cover these visits. Please check with your medical insurance if this type of visit is covered. You will be responsible for any charges that are not paid by your insurance.      E-visits via [employer]:  generally incur a $35.00 fee.  Telephone visits:  Time spent on the phone: *charged based on time that is spent on the phone in increments of 10 minutes. Estimated cost:   5-10 mins $30.00   11-20 mins. $59.00   21-30 mins. $85.00     Use [employer] (secure email communication and access to your chart) to send your primary care provider a message or make an appointment. Ask someone on your Team how to sign up for [employer].  For a Price Quote for your services, please  call our Consumer Price Line at [phone].      As always, Thank you for trusting us with your health care needs!  Rigo Orosco

## 2017-04-17 NOTE — PROGRESS NOTES
"  SUBJECTIVE:                                                    Bon Madison is a 62 year old female who presents to clinic today for the following health issues:    Neck Pain     Onset: 3 weeks    Description:   Location: upper  Radiation: left arm    Intensity: mild    Progression of Symptoms:  same    Accompanying Signs & Symptoms:  Burning, prickly sensation (paresthesias) in arm(s): no   Numbness in arm(s): no   Weakness in arm(s):  no   Fever: no   Headache: YES  Nausea and/or vomiting: no    History:   Trauma: no   Previous neck pain: no   Previous surgery or injections: no   Previous Imaging (MRI,X ray): no     Precipitating factors:   Does movement increase the pain:  YES    Alleviating factors:       Therapies Tried and outcome:  tylenol    ROS:  C: NEGATIVE for fever, chills, change in weight  I: NEGATIVE for worrisome rashes, moles or lesions  EYES: red eyes   MUSCULOSKELETAL: see HPI   N: NEGATIVE for weakness, dizziness or paresthesias    OBJECTIVE:                                                    /84 (BP Location: Left arm, Patient Position: Chair, Cuff Size: Adult Regular)  Pulse 77  Temp 97.1  F (36.2  C) (Oral)  Ht 5' 6\" (1.676 m)  Wt 161 lb (73 kg)  SpO2 100%  Breastfeeding? No  BMI 25.99 kg/m2  Body mass index is 25.99 kg/(m^2).  GENERAL: alert and no distress  NECK: no adenopathy, no asymmetry, masses, or scars and thyroid normal to palpation  RESP: lungs clear to auscultation - no rales, rhonchi or wheezes  CV: regular rate and rhythm, normal S1 S2, no S3 or S4, no murmur  MS: no gross musculoskeletal defects noted, no edema  NEURO: Normal strength and tone, mentation intact and speech normal  PSYCH: mentation appears normal, affect normal/bright    Diagnostic Test Results:  Results for orders placed or performed in visit on 02/21/17   Cytology non gyn   Result Value Ref Range    Copath Report       Patient Name: BON MADISON  MR#: [id number]  Specimen #: [id number]  Collected: " 2/21/2017  Received: 2/22/2017  Reported: 2/23/2017 15:16  Ordering Phy(s): SONIA CLEMENTE    For improved result formatting, select 'View Enhanced Report Format',  under Linked Documents section.    SPECIMEN/STAIN PROCESS:  Urine-voided       Pap-Cyto x 1    ----------------------------------------------------------------  CYTOLOGIC INTERPRETATION:     Urine-voided:   Nondiagnostic specimen.  Specimen Adequacy: Unsatisfactory for evaluation, due to:  ..scant urothelial cells present.    Electronically signed out by:    SANDY Farr M.D.    Processed and screened at [facility]    CLINICAL HISTORY:  Hematuria.    ,    GROSS:  Urine-voided:  Received 20 ml of yellow, clear fluid, processed as 1 Pap  stained [employer]..    MICROSCOPIC:  Microscopic examination was performed. Essentially acellular smear  (Dictated by: HARSHIL Farr MD 02/23/2017)     CPT Codes:  A: 22797-[id number]    TESTING LAB LOCATION:  [address] [address] [phone]    COLLECTION SITE:  Client:  [facility]  Location:  [facility] (B)          ASSESSMENT/PLAN:                                                    (M54.2) Neck pain  (primary encounter diagnosis)  Comment: suspect strain   Plan: methocarbamol (ROBAXIN) 750 MG tablet, ENRRIQUE PT,         HAND, AND CHIROPRACTIC REFERRAL        Over the counter pain medication as needed, ice or heat as she finds helpful, avoidance of aggravating activities, and return for persistence for consideration of further imaging or referral.     (N83.201) Right ovarian cyst  Plan: recommended follow-up ultrasound     See Patient Instructions    Tayla Elena MD  [facility]

## 2017-04-17 NOTE — MR AVS SNAPSHOT
After Visit Summary   4/17/2017    Isa Stanton    MRN: [id number]           Patient Information     Date Of Birth          1955        Visit Information        Provider Department      4/17/2017 10:30 AM Talya Elena MD; [employer] [facility]        Today's Diagnoses     Neck pain    -  1    Right ovarian cyst          Care Instructions    Call the imaging center at [phone] or  [phone] to schedule your pelvic ultrasound to follow-up right ovary cyst.      [facility]    If you have any questions regarding to your visit please contact your care team:       Team Red:   Clinic Hours Telephone Number   Dr. Tayla Walden, NP   7am-7pm  Monday - Thursday   7am-5pm  Fridays  [phone]  (Appointment scheduling available 24/7)    Questions about your visit?   Team Line  [phone]   Urgent Care - [place] and [place] - 11am-9pm Monday-Friday Saturday-Sunday- 9am-5pm   [place] - 5pm-9pm Monday-Friday Saturday-Sunday- 9am-5pm  [phone] - [place]   [phone] - [place]       What options do I have for visits at the clinic other than the traditional office visit?  To expand how we care for you, many of our providers are utilizing electronic visits (e-visits) and telephone visits, when medically appropriate, for interactions with their patients rather than a visit in the clinic.   We also offer nurse visits for many medical concerns. Just like any other service, we will bill your insurance company for this type of visit based on time spent on the phone with your provider. Not all insurance companies cover these visits. Please check with your medical insurance if this type of visit is covered. You will be responsible for any charges that are not paid by your insurance.      E-visits via [employer]:  generally incur a $35.00 fee.  Telephone visits:  Time spent on the  phone: *charged based on time that is spent on the phone in increments of 10 minutes. Estimated cost:   5-10 mins $30.00   11-20 mins. $59.00   21-30 mins. $85.00     Use [employer] (secure email communication and access to your chart) to send your primary care provider a message or make an appointment. Ask someone on your Team how to sign up for [employer].  For a Price Quote for your services, please call our [employer] Price Line at [phone].      As always, Thank you for trusting us with your health care needs!  Rigo Orosco          Follow-ups after your visit        Additional Services     [facility] PT, HAND, AND CHIROPRACTIC REFERRAL       **This order will print in the [facility] Scheduling Office**    Physical Therapy, Hand Therapy and Chiropractic Care are available through:    *[facility]  *[facility]  *[facility]    Call one number to schedule at any of the above locations: [phone].    Your provider has referred you to: As Indicated:      Indication/Reason for Referral:    Onset of Illness:    Therapy Orders: Evaluate and Treat  Special Programs: None  Special Request: None    Jenny Alvarado      Additional Comments for the Therapist or Chiropractor:      Please be aware that coverage of these services is subject to the terms and limitations of your health insurance plan.  Call member services at your health plan with any benefit or coverage questions.      Please bring the following to your appointment:    *Your personal calendar for scheduling future appointments  *Comfortable clothing                  Follow-up notes from your care team     Return in about 9 months (around 1/16/2018), or if symptoms worsen or fail to improve, for physical (fasting labs up to one week prior).      Who to contact     If you have questions or need follow up information about today's clinic visit or your schedule please contact [facility] [place] directly at  "[phone].  Normal or non-critical lab and imaging results will be communicated to you by MyChart, letter or phone within 4 business days after the clinic has received the results. If you do not hear from us within 7 days, please contact the clinic through MyChart or phone. If you have a critical or abnormal lab result, we will notify you by phone as soon as possible.  Submit refill requests through [employer] or call your pharmacy and they will forward the refill request to us. Please allow 3 business days for your refill to be completed.          Additional Information About Your Visit        Care EveryWhere ID     This is your Care EveryWhere ID. This could be used by other organizations to access your [facility] medical records  [id number]        Your Vitals Were     Pulse Temperature Height Pulse Oximetry Breastfeeding? BMI (Body Mass Index)    77 97.1  F (36.2  C) (Oral) 5' 6\" (1.676 m) 100% No 25.99 kg/m2       Blood Pressure from Last 3 Encounters:   04/17/17 134/84   04/11/17 132/87   02/21/17 132/80    Weight from Last 3 Encounters:   04/17/17 161 lb (73 kg)   01/16/17 160 lb 6.4 oz (72.8 kg)   09/27/16 162 lb (73.5 kg)              We Performed the Following     [facility] PT, HAND, AND CHIROPRACTIC REFERRAL          Today's Medication Changes          These changes are accurate as of: 4/17/17 11:30 AM.  If you have any questions, ask your nurse or doctor.               Start taking these medicines.        Dose/Directions    methocarbamol 750 MG tablet   Commonly known as:  ROBAXIN   Used for:  Neck pain   Started by:  Tayla Elena MD        Dose:  750 mg   Take 1 tablet (750 mg) by mouth 4 times daily as needed for muscle spasms   Quantity:  30 tablet   Refills:  1            Where to get your medicines      These medications were sent to [id number] - [place], [place] - [address]  [address] [place] [address] Debora SAGE [address]     Phone:  [phone]     methocarbamol 750 MG tablet                " Primary Care Provider Office Phone # Fax #    Tayla Elena [phone]       [address]  [place] [address]        Thank you!     Thank you for choosing [facility]  for your care. Our goal is always to provide you with excellent care. Hearing back from our patients is one way we can continue to improve our services. Please take a few minutes to complete the written survey that you may receive in the mail after your visit with us. Thank you!             Your Updated Medication List - Protect others around you: Learn how to safely use, store and throw away your medicines at www.disposemymeds.org.          This list is accurate as of: 4/17/17 11:30 AM.  Always use your most recent med list.                   Brand Name Dispense Instructions for use    acetaminophen 500 MG tablet    TYLENOL    100 tablet    Take 1 tablet (500 mg) by mouth every 6 hours as needed for pain       hydrochlorothiazide 25 MG tablet    HYDRODIURIL    90 tablet    TAKE 1 TABLET BY MOUTH DAILY FOR BLOOD PRESSURE       methocarbamol 750 MG tablet    ROBAXIN    30 tablet    Take 1 tablet (750 mg) by mouth 4 times daily as needed for muscle spasms

## 2017-04-17 NOTE — NURSING NOTE
"Chief Complaint   Patient presents with     Neck Pain     x 3 weeks       Initial /84 (BP Location: Left arm, Patient Position: Chair, Cuff Size: Adult Regular)  Pulse 77  Temp 97.1  F (36.2  C) (Oral)  Ht 5' 6\" (1.676 m)  Wt 161 lb (73 kg)  SpO2 100%  Breastfeeding? No  BMI 25.99 kg/m2 Estimated body mass index is 25.99 kg/(m^2) as calculated from the following:    Height as of this encounter: 5' 6\" (1.676 m).    Weight as of this encounter: 161 lb (73 kg).  Medication Reconciliation: complete   Елена RODRIGUEZ MA      "

## 2017-04-26 ENCOUNTER — THERAPY VISIT (OUTPATIENT)
Dept: PHYSICAL THERAPY | Facility: CLINIC | Age: 62
End: 2017-04-26
Payer: COMMERCIAL

## 2017-04-26 DIAGNOSIS — M54.2 NECK PAIN: Primary | ICD-10-CM

## 2017-04-26 PROCEDURE — 97110 THERAPEUTIC EXERCISES: CPT | Mod: GP | Performed by: PHYSICAL THERAPIST

## 2017-04-26 PROCEDURE — 97161 PT EVAL LOW COMPLEX 20 MIN: CPT | Mod: GP | Performed by: PHYSICAL THERAPIST

## 2017-04-26 PROCEDURE — G0283 ELEC STIM OTHER THAN WOUND: HCPCS | Mod: GP | Performed by: PHYSICAL THERAPIST

## 2017-04-26 NOTE — MR AVS SNAPSHOT
After Visit Summary   4/26/2017    Isa Stanton    MRN: [id number]           Patient Information     Date Of Birth          1955        Visit Information        Provider Department      4/26/2017 2:10 PM Елена Stahl, PT [facility]        Today's Diagnoses     Neck pain    -  1       Follow-ups after your visit        Your next 10 appointments already scheduled     May 01, 2017  2:10 PM CDT   [facility] Spine with Елена Stahl, PT   [facility] ([facility]  )    [address]  [facility] [id number] [phone]            May 08, 2017  2:10 PM CDT   [facility] Spine with Елена Stahl, PT   [facility] ([facility]  )    [address]  [facility] [id number] [phone]              Who to contact     If you have questions or need follow up information about today's clinic visit or your schedule please contact [facility] directly at [phone].  Normal or non-critical lab and imaging results will be communicated to you by MyChart, letter or phone within 4 business days after the clinic has received the results. If you do not hear from us within 7 days, please contact the clinic through MyChart or phone. If you have a critical or abnormal lab result, we will notify you by phone as soon as possible.  Submit refill requests through [employer] or call your pharmacy and they will forward the refill request to us. Please allow 3 business days for your refill to be completed.          Additional Information About Your Visit        Care EveryWhere ID     This is your Care EveryWhere ID. This could be used by other organizations to access your [facility] medical records  [id number]         Blood Pressure from Last 3 Encounters:   04/17/17 134/84   04/11/17 132/87   02/21/17 132/80    Weight from Last 3 Encounters:   04/17/17 73 kg (161 lb)    01/16/17 72.8 kg (160 lb 6.4 oz)   09/27/16 73.5 kg (162 lb)              We Performed the Following     ELECTRIC STIMULATION THERAPY     HC PT EVAL, LOW COMPLEXITY     [facility] INITIAL EVAL REPORT     THERAPEUTIC EXERCISES        Primary Care Provider Office Phone # Fax #    Tayla Elena [phone]       [address] [address]        Thank you!     Thank you for choosing [facility]  for your care. Our goal is always to provide you with excellent care. Hearing back from our patients is one way we can continue to improve our services. Please take a few minutes to complete the written survey that you may receive in the mail after your visit with us. Thank you!             Your Updated Medication List - Protect others around you: Learn how to safely use, store and throw away your medicines at www.disposemymeds.org.          This list is accurate as of: 4/26/17 11:59 PM.  Always use your most recent med list.                   Brand Name Dispense Instructions for use    acetaminophen 500 MG tablet    TYLENOL    100 tablet    Take 1 tablet (500 mg) by mouth every 6 hours as needed for pain       hydrochlorothiazide 25 MG tablet    HYDRODIURIL    90 tablet    TAKE 1 TABLET BY MOUTH DAILY FOR BLOOD PRESSURE       methocarbamol 750 MG tablet    ROBAXIN    30 tablet    Take 1 tablet (750 mg) by mouth 4 times daily as needed for muscle spasms

## 2017-10-06 ENCOUNTER — TELEPHONE (OUTPATIENT)
Dept: GENERAL RADIOLOGY | Facility: CLINIC | Age: 62
End: 2017-10-06

## 2017-10-06 NOTE — TELEPHONE ENCOUNTER
Called pt with reminder to schedule a diagnostic mammogram to evaluate an area on the right breast which has been recommended for biopsy. No answer. Left a message with patient's daughter, Juan, along with contact information for the breast center.

## 2017-11-21 NOTE — TELEPHONE ENCOUNTER
Spoke with patient's daughter, Juan, regarding need for additional mammogram views since patient is refusing the recommended biopsy. Juan stated that she believes her mother will agree to additional mammogram pictures, and she will call back to schedule. She was provided with the contact information for the breast nurse and also for scheduling.

## 2018-02-02 NOTE — TELEPHONE ENCOUNTER
Again spoke with patient's daughter, Juan, regarding breast imaging recommendation for her mother Isa. Juan states that Isa is willing to come in for extra images, but she doesn't want to schedule those images yet. Contact information given for the breast center and scheduling as well. This was the final attempt to contact this patient to schedule the recommended imaging. If she does not schedule, she will be considered lost to follow-up.

## 2018-02-06 NOTE — TELEPHONE ENCOUNTER
Please call patient herself with  with number for scheduling at breast center: your mammogram is not normal, and this is concerning for possible breast cancer in your right breast. It is very important that you schedule additional testing so that treatment can be started right away if needed.   Tayla Elena MD

## 2018-02-06 NOTE — TELEPHONE ENCOUNTER
Called patient via [employer]  and message left to call Felicita Banks RN line back.   Oma Martinez RN

## 2018-02-08 NOTE — TELEPHONE ENCOUNTER
Called patient via [facility]  service-    Female voice picked up and stated that patient is not available then hung up    Jason Colbert RN

## 2018-02-12 NOTE — TELEPHONE ENCOUNTER
Called out to patient multiple times with no return call.  Closing encounter.   Oma Martinez RN

## 2018-04-25 ENCOUNTER — OFFICE VISIT (OUTPATIENT)
Dept: FAMILY MEDICINE | Facility: CLINIC | Age: 63
End: 2018-04-25
Payer: COMMERCIAL

## 2018-04-25 VITALS
OXYGEN SATURATION: 98 % | TEMPERATURE: 98.2 F | WEIGHT: 165.8 LBS | DIASTOLIC BLOOD PRESSURE: 78 MMHG | HEART RATE: 94 BPM | SYSTOLIC BLOOD PRESSURE: 125 MMHG | RESPIRATION RATE: 20 BRPM | HEIGHT: 66 IN | BODY MASS INDEX: 26.65 KG/M2

## 2018-04-25 DIAGNOSIS — M54.50 ACUTE RIGHT-SIDED LOW BACK PAIN WITHOUT SCIATICA: Primary | ICD-10-CM

## 2018-04-25 DIAGNOSIS — I10 HYPERTENSION GOAL BP (BLOOD PRESSURE) < 150/90: ICD-10-CM

## 2018-04-25 LAB
ANION GAP SERPL CALCULATED.3IONS-SCNC: 7 MMOL/L (ref 3–14)
BUN SERPL-MCNC: 16 MG/DL (ref 7–30)
CALCIUM SERPL-MCNC: 8.9 MG/DL (ref 8.5–10.1)
CHLORIDE SERPL-SCNC: 105 MMOL/L (ref 94–109)
CO2 SERPL-SCNC: 27 MMOL/L (ref 20–32)
CREAT SERPL-MCNC: 0.68 MG/DL (ref 0.52–1.04)
GFR SERPL CREATININE-BSD FRML MDRD: 89 ML/MIN/1.7M2
GLUCOSE SERPL-MCNC: 175 MG/DL (ref 70–99)
POTASSIUM SERPL-SCNC: 4.3 MMOL/L (ref 3.4–5.3)
SODIUM SERPL-SCNC: 139 MMOL/L (ref 133–144)

## 2018-04-25 PROCEDURE — 99214 OFFICE O/P EST MOD 30 MIN: CPT | Performed by: FAMILY MEDICINE

## 2018-04-25 PROCEDURE — 80048 BASIC METABOLIC PNL TOTAL CA: CPT | Performed by: FAMILY MEDICINE

## 2018-04-25 PROCEDURE — 36415 COLL VENOUS BLD VENIPUNCTURE: CPT | Performed by: FAMILY MEDICINE

## 2018-04-25 RX ORDER — LIDOCAINE 50 MG/G
PATCH TOPICAL
Qty: 10 PATCH | Refills: 1 | Status: SHIPPED | OUTPATIENT
Start: 2018-04-25 | End: 2020-03-30

## 2018-04-25 RX ORDER — METHOCARBAMOL 750 MG/1
750 TABLET, FILM COATED ORAL 4 TIMES DAILY PRN
Qty: 30 TABLET | Refills: 1 | Status: SHIPPED | OUTPATIENT
Start: 2018-04-25 | End: 2020-03-30

## 2018-04-25 ASSESSMENT — PAIN SCALES - GENERAL: PAINLEVEL: SEVERE PAIN (7)

## 2018-04-25 NOTE — PATIENT INSTRUCTIONS
Back Care Tips    Caring for your back  These are things you can do to prevent a recurrence of acute back pain and to reduce symptoms from chronic back pain:    Maintain a healthy weight. If you are overweight, losing weight will help most types of back pain.    Exercise is an important part of recovery from most types of back pain. The muscles behind and in front of the spine support the back. This means strengthening both the back muscles and the abdominal muscles will provide better support for your spine.     Swimming and brisk walking are good overall exercises to improve your fitness level.    Practice safe lifting methods (below).    Practice good posture when sitting, standing and walking. Avoid prolonged sitting. This puts more stress on the lower back than standing or walking.    Wear quality shoes with sufficient arch support. Foot and ankle alignment can affect back symptoms. Women should avoid wearing high heels.    Therapeutic massage can help relax the back muscles without stretching them.    During the first 24 to 72 hours after an acute injury or flare-up of chronic back pain, apply an ice pack to the painful area for 20 minutes and then remove it for 20 minutes, over a period of 60 to 90 minutes, or several times a day. As a safety precaution, do not use a heating pad at bedtime. Sleeping on a heating pad can lead to skin burns or tissue damage.    You can alternate ice and heat therapies.  Medicines  Talk to your healthcare provider before using medicines, especially if you have other medical problems or are taking other medicines.    You may use acetaminophen or ibuprofen to control pain, unless your healthcare provider prescribed other pain medicine. If you have chronic conditions like diabetes, liver or kidney disease, stomach ulcers, or gastrointestinal bleeding, or are taking blood thinners, talk with your healthcare provider before taking any medicines.    Be careful if you are given  prescription pain medicines, narcotics, or medicine for muscle spasm. They can cause drowsiness, affect your coordination, reflexes, and judgment. Do not drive or operate heavy machinery while taking these types of medicines. Take prescription pain medicine only as prescribed by your healthcare provider.  Lumbar stretch  Here is a simple stretching exercise that will help relax muscle spasm and keep your back more limber. If exercise makes your back pain worse, don t do it.    Lie on your back with your knees bent and both feet on the ground.    Slowly raise your left knee to your chest as you flatten your lower back against the floor. Hold for 5 seconds.    Relax and repeat the exercise with your right knee.    Do 10 of these exercises for each leg.  Safe lifting method    Don t bend over at the waist to lift an object off the floor.  Instead, bend your knees and hips in a squat.     Keep your back and head upright    Hold the object close to your body, directly in front of you.    Straighten your legs to lift the object.     Lower the object to the floor in the reverse fashion.    If you must slide something across the floor, push it.  Posture tips  Sitting  Sit in chairs with straight backs or low-back support. Keep your knees lower than your hips, with your feet flat on the floor.  When driving, sit up straight. Adjust the seat forward so you are not leaning toward the steering wheel.  A small pillow or rolled towel behind your lower back may help if you are driving long distances.   Standing  When standing for long periods, shift most of your weight to one leg at a time. Alternate legs every few minutes.   Sleeping  The best way to sleep is on your side with your knees bent. Put a low pillow under your head to support your neck in a neutral spine position. Avoid thick pillows that bend your neck to one side. Put a pillow between your legs to further relax your lower back. If you sleep on your back, put pillows  under your knees to support your legs in a slightly flexed position. Use a firm mattress. If your mattress sags, replace it, or use a 1/2-inch plywood board under the mattress to add support.  Follow-up care  Follow up with your healthcare provider, or as advised.  If X-rays, a CT scan or an MRI scan were taken, they will be reviewed by a radiologist. You will be notified of any new findings that may affect your care.  Call 911  Call 911 if any of the following occur:    Trouble breathing    Confusion    Very drowsy    Fainting or loss of consciousness    Rapid or very slow heart rate    Loss of  bowel or bladder control  When to seek medical advice  Call your healthcare provider right away if any of the following occur:    Pain becomes worse or spreads to your arms or legs    Weakness or numbness in one or both arms or legs    Numbness in the groin area  Date Last Reviewed: 6/1/2016 2000-2017 The [employer]. [address] [place] [address]. All rights reserved. This information is not intended as a substitute for professional medical care. Always follow your healthcare professional's instructions.

## 2018-04-25 NOTE — PROGRESS NOTES
SUBJECTIVE:   Isa Stanton is a 63 year old female who presents to clinic today for the following health issues:    Back Pain       Duration: 3x days        Specific cause: none    Description:   Location of pain: low back both and middle center of back   Character of pain: sharp, stabbing and constant  Pain radiation:none  New numbness or weakness in legs, not attributed to pain:  no     Intensity: Currently 7/10    History:   Pain interferes with job: Not applicable, not working  History of back problems: History of back pain 2 years ago  Any previous MRI or X-rays: None  Sees a specialist for back pain:  No  Therapies tried without relief: hot tub and then massage, tylenol    Alleviating factors:   Improved by: never completely gone      Precipitating factors:  Worsened by: Any particular movements/ active movements will hurts    Functional and Psychosocial Screen (Jenny STarT Back):      Not performed today          Accompanying Signs & Symptoms:  Risk of Fracture:  None  Risk of Cauda Equina:  None  Risk of Infection:  None  Risk of Cancer:  None  Risk of Ankylosing Spondylitis:  Onset at age <35, male, AND morning back stiffness. no          Hypertension Follow-up      Outpatient blood pressures are not being checked.    Low Salt Diet: no added salt      Problem list and histories reviewed & adjusted, as indicated.  Additional history: as documented    Patient Active Problem List   Diagnosis     Advanced directives, counseling/discussion     CARDIOVASCULAR SCREENING; LDL GOAL LESS THAN 160     Osteoarthritis of knees, bilateral     Vitamin D deficiency     Neck pain     Abdominal pain, generalized     Hypertension goal BP (blood pressure) < 150/90     Combined form of age-related cataract, both eyes     Pterygium of eye, left     Dermatochalasis of both upper eyelids     Right ovarian cyst     Past Surgical History:   Procedure Laterality Date     NO HISTORY OF SURGERY         Social History   Substance Use  "Topics     Smoking status: Never Smoker     Smokeless tobacco: Never Used     Alcohol use No     Family History   Problem Relation Age of Onset     DIABETES No family hx of      HEART DISEASE No family hx of      CANCER No family hx of            Reviewed and updated as needed this visit by clinical staff  Tobacco  Allergies  Meds  Problems       Reviewed and updated as needed this visit by Provider  Allergies  Meds  Problems         ROS:  Constitutional, HEENT, cardiovascular, pulmonary, gi and gu systems are negative, except as otherwise noted.    OBJECTIVE:     /78 (BP Location: Left arm, Patient Position: Chair, Cuff Size: Adult Regular)  Pulse 94  Temp 98.2  F (36.8  C) (Oral)  Resp 20  Ht 5' 5.75\" (1.67 m)  Wt 165 lb 12.8 oz (75.2 kg)  SpO2 98%  Breastfeeding? No  BMI 26.97 kg/m2  Body mass index is 26.97 kg/(m^2).  GENERAL: healthy, alert and no distress  NECK: no adenopathy, no asymmetry, masses, or scars and thyroid normal to palpation  RESP: lungs clear to auscultation - no rales, rhonchi or wheezes  CV: regular rate and rhythm, normal S1 S2, no S3 or S4, no murmur, click or rub, no peripheral edema and peripheral pulses strong  ABDOMEN: soft, nontender, no hepatosplenomegaly, no masses and bowel sounds normal  MS: right lumbar muscle tightness, no specific point tenderness.  NEURO: Normal strength and tone, mentation intact and speech normal    Diagnostic Test Results:  none     ASSESSMENT/PLAN:     1. Acute right-sided low back pain without sciatica  Rest, heat, gentle stretching and medications as below  - methocarbamol (ROBAXIN) 750 MG tablet; Take 1 tablet (750 mg) by mouth 4 times daily as needed for muscle spasms  Dispense: 30 tablet; Refill: 1  - lidocaine (LIDODERM) 5 % Patch; Apply up to 1 patches to painful area at once for up to 12 h within a 24 h period.  Remove after 12 hours.  Dispense: 10 patch; Refill: 1    2. Hypertension goal BP (blood pressure) < 150/90  Controlled " - continue current treatment and check lab.  - BASIC METABOLIC PANEL    The uses and side effects, including black box warnings as appropriate, were discussed in detail.  All patient questions were answered.  The patient was instructed to call immediately if any side effects developed.     FUTURE APPOINTMENTS:       - Follow-up visit in 1 - 2 weeks if not improved.    Kimberly Ramirez MD  [facility]

## 2018-04-25 NOTE — PROGRESS NOTES
MsGuerline Stanton,    Your kidney function was normal.    Please contact the clinic if you have additional questions.  Thank you.    Sincerely,    Kimberly Ramirez

## 2018-04-25 NOTE — LETTER
[address] [address]  [phone]                                                                                                           Isa Stanton  [address] [place] [address]    April 25, 2018    Ms. Stanton,     Your kidney function was normal.     Please contact the clinic if you have additional questions.  Thank you.     Sincerely,     Kimberly Ramirez/ziggy    Results for orders placed or performed in visit on 04/25/18   BASIC METABOLIC PANEL   Result Value Ref Range    Sodium 139 133 - 144 mmol/L    Potassium 4.3 3.4 - 5.3 mmol/L    Chloride 105 94 - 109 mmol/L    Carbon Dioxide 27 20 - 32 mmol/L    Anion Gap 7 3 - 14 mmol/L    Glucose 175 (H) 70 - 99 mg/dL    Urea Nitrogen 16 7 - 30 mg/dL    Creatinine 0.68 0.52 - 1.04 mg/dL    GFR Estimate 89 >60 mL/min/1.7m2    GFR Estimate If Black >90 >60 mL/min/1.7m2    Calcium 8.9 8.5 - 10.1 mg/dL

## 2018-04-25 NOTE — MR AVS SNAPSHOT
After Visit Summary   4/25/2018    Isa Stanton    MRN: [id number]           Patient Information     Date Of Birth          1955        Visit Information        Provider Department      4/25/2018 9:00 AM Kimberly Guillermo MD [facility]        Today's Diagnoses     Acute right-sided low back pain without sciatica    -  1    Hypertension goal BP (blood pressure) < 150/90          Care Instructions      Back Care Tips    Caring for your back  These are things you can do to prevent a recurrence of acute back pain and to reduce symptoms from chronic back pain:    Maintain a healthy weight. If you are overweight, losing weight will help most types of back pain.    Exercise is an important part of recovery from most types of back pain. The muscles behind and in front of the spine support the back. This means strengthening both the back muscles and the abdominal muscles will provide better support for your spine.     Swimming and brisk walking are good overall exercises to improve your fitness level.    Practice safe lifting methods (below).    Practice good posture when sitting, standing and walking. Avoid prolonged sitting. This puts more stress on the lower back than standing or walking.    Wear quality shoes with sufficient arch support. Foot and ankle alignment can affect back symptoms. Women should avoid wearing high heels.    Therapeutic massage can help relax the back muscles without stretching them.    During the first 24 to 72 hours after an acute injury or flare-up of chronic back pain, apply an ice pack to the painful area for 20 minutes and then remove it for 20 minutes, over a period of 60 to 90 minutes, or several times a day. As a safety precaution, do not use a heating pad at bedtime. Sleeping on a heating pad can lead to skin burns or tissue damage.    You can alternate ice and heat therapies.  Medicines  Talk to your healthcare provider before using  medicines, especially if you have other medical problems or are taking other medicines.    You may use acetaminophen or ibuprofen to control pain, unless your healthcare provider prescribed other pain medicine. If you have chronic conditions like diabetes, liver or kidney disease, stomach ulcers, or gastrointestinal bleeding, or are taking blood thinners, talk with your healthcare provider before taking any medicines.    Be careful if you are given prescription pain medicines, narcotics, or medicine for muscle spasm. They can cause drowsiness, affect your coordination, reflexes, and judgment. Do not drive or operate heavy machinery while taking these types of medicines. Take prescription pain medicine only as prescribed by your healthcare provider.  Lumbar stretch  Here is a simple stretching exercise that will help relax muscle spasm and keep your back more limber. If exercise makes your back pain worse, don t do it.    Lie on your back with your knees bent and both feet on the ground.    Slowly raise your left knee to your chest as you flatten your lower back against the floor. Hold for 5 seconds.    Relax and repeat the exercise with your right knee.    Do 10 of these exercises for each leg.  Safe lifting method    Don t bend over at the waist to lift an object off the floor.  Instead, bend your knees and hips in a squat.     Keep your back and head upright    Hold the object close to your body, directly in front of you.    Straighten your legs to lift the object.     Lower the object to the floor in the reverse fashion.    If you must slide something across the floor, push it.  Posture tips  Sitting  Sit in chairs with straight backs or low-back support. Keep your knees lower than your hips, with your feet flat on the floor.  When driving, sit up straight. Adjust the seat forward so you are not leaning toward the steering wheel.  A small pillow or rolled towel behind your lower back may help if you are driving  long distances.   Standing  When standing for long periods, shift most of your weight to one leg at a time. Alternate legs every few minutes.   Sleeping  The best way to sleep is on your side with your knees bent. Put a low pillow under your head to support your neck in a neutral spine position. Avoid thick pillows that bend your neck to one side. Put a pillow between your legs to further relax your lower back. If you sleep on your back, put pillows under your knees to support your legs in a slightly flexed position. Use a firm mattress. If your mattress sags, replace it, or use a 1/2-inch plywood board under the mattress to add support.  Follow-up care  Follow up with your healthcare provider, or as advised.  If X-rays, a CT scan or an MRI scan were taken, they will be reviewed by a radiologist. You will be notified of any new findings that may affect your care.  Call 911  Call 911 if any of the following occur:    Trouble breathing    Confusion    Very drowsy    Fainting or loss of consciousness    Rapid or very slow heart rate    Loss of  bowel or bladder control  When to seek medical advice  Call your healthcare provider right away if any of the following occur:    Pain becomes worse or spreads to your arms or legs    Weakness or numbness in one or both arms or legs    Numbness in the groin area  Date Last Reviewed: 6/1/2016 2000-2017 [employer]. [address] [place] [address]. All rights reserved. This information is not intended as a substitute for professional medical care. Always follow your healthcare professional's instructions.                Follow-ups after your visit        Who to contact     If you have questions or need follow up information about today's clinic visit or your schedule please contact [facility] directly at [phone].  Normal or non-critical lab and imaging results will be communicated to you by MyChart, letter or phone within 4  "business days after the clinic has received the results. If you do not hear from us within 7 days, please contact the clinic through [employer] or phone. If you have a critical or abnormal lab result, we will notify you by phone as soon as possible.  Submit refill requests through [employer] or call your pharmacy and they will forward the refill request to us. Please allow 3 business days for your refill to be completed.          Additional Information About Your Visit        [employer] Information     [employer] lets you send messages to your doctor, view your test results, renew your prescriptions, schedule appointments and more. To sign up, go to [email or web address] . Click on \"Log in\" on the left side of the screen, which will take you to the Welcome page. Then click on \"Sign up Now\" on the right side of the page.     You will be asked to enter the access code listed below, as well as some personal information. Please follow the directions to create your username and password.     Your access code is: [email or web address]  Expires: 2018  9:20 AM     Your access code will  in 90 days. If you need help or a new code, please call your [facility] clinic or [phone].        Care EveryWhere ID     This is your Care EveryWhere ID. This could be used by other organizations to access your [facility] medical records  [id number]        Your Vitals Were     Pulse Temperature Respirations Height Pulse Oximetry Breastfeeding?    94 98.2  F (36.8  C) (Oral) 20 5' 5.75\" (1.67 m) 98% No    BMI (Body Mass Index)                   26.97 kg/m2            Blood Pressure from Last 3 Encounters:   18 125/78   17 134/84   17 132/87    Weight from Last 3 Encounters:   18 165 lb 12.8 oz (75.2 kg)   17 161 lb (73 kg)   17 160 lb 6.4 oz (72.8 kg)              We Performed the Following     BASIC METABOLIC PANEL          Today's Medication Changes          These changes are accurate as of 18  9:20 AM.  " If you have any questions, ask your nurse or doctor.               Start taking these medicines.        Dose/Directions    lidocaine 5 % Patch   Commonly known as:  LIDODERM   Used for:  Acute right-sided low back pain without sciatica   Started by:  Kimberly Guillermo MD        Apply up to 1 patches to painful area at once for up to 12 h within a 24 h period.  Remove after 12 hours.   Quantity:  10 patch   Refills:  1            Where to get your medicines      These medications were [place] [facility] [place] - [address]  [address], [facility] [address]     Phone:  [phone]     lidocaine 5 % Patch    methocarbamol 750 MG tablet                Primary Care Provider Office Phone # Fax #    Tayla Elena [phone] [address] [facility]  [place] [address]        Equal Access to Services     [facility]: Hadii aad ku hadasho Soomaali, waaxda luqadaha, qaybta kaalmada adeegyada, waxay chachoin hayaamary erickson . So [facility] [phone].    ATENCIÓN: Si habla español, tiene a terrell disposición servicios gratuitos de asistencia lingüística. Llame al [phone].    We comply with applicable federal civil rights laws and [place] laws. We do not discriminate on the basis of race, color, national origin, age, disability, sex, sexual orientation, or gender identity.            Thank you!     Thank you for choosing [facility]  for your care. Our goal is always to provide you with excellent care. Hearing back from our patients is one way we can continue to improve our services. Please take a few minutes to complete the written survey that you may receive in the mail after your visit with us. Thank you!             Your Updated Medication List - Protect others around you: Learn how to safely use, store and throw away your medicines at www.disposemymeds.org.          This list is accurate as of 4/25/18  9:20  AM.  Always use your most recent med list.                   Brand Name Dispense Instructions for use Diagnosis    acetaminophen 500 MG tablet    TYLENOL    100 tablet    Take 1 tablet (500 mg) by mouth every 6 hours as needed for pain    Primary osteoarthritis of both knees       hydrochlorothiazide 25 MG tablet    HYDRODIURIL    90 tablet    TAKE 1 TABLET BY MOUTH DAILY FOR BLOOD PRESSURE    Hypertension goal BP (blood pressure) < 150/90       lidocaine 5 % Patch    LIDODERM    10 patch    Apply up to 1 patches to painful area at once for up to 12 h within a 24 h period.  Remove after 12 hours.    Acute right-sided low back pain without sciatica       methocarbamol 750 MG tablet    ROBAXIN    30 tablet    Take 1 tablet (750 mg) by mouth 4 times daily as needed for muscle spasms    Hypertension goal BP (blood pressure) < 150/90

## 2018-08-28 ENCOUNTER — HEALTH MAINTENANCE LETTER (OUTPATIENT)
Age: 63
End: 2018-08-28

## 2019-01-11 ENCOUNTER — TELEPHONE (OUTPATIENT)
Dept: FAMILY MEDICINE | Facility: CLINIC | Age: 64
End: 2019-01-11

## 2019-01-11 RX ORDER — ERGOCALCIFEROL 1.25 MG/1
CAPSULE, LIQUID FILLED ORAL
Qty: 4 CAPSULE | Refills: 0 | OUTPATIENT
Start: 2019-01-11

## 2019-01-11 NOTE — TELEPHONE ENCOUNTER
vitamin D (ERGOCALCIFEROL) 59221 UNIT capsule 8 capsule 0 1/17/2017 3/8/2017 --   Sig - Route: Take 1 capsule (50,000 Units) by mouth every 7 days for 8 doses . Then, take over-the-counter vitamin D 2000 units daily indefinitely (no refills) - Oral   Class: Fax   Order: [id number]             Last Written Prescription Date:  1/17/2107  Last Fill Quantity: 8,   # refills: 0  Last Office Visit: 4/17/2017  Future Office visit:       Routing refill request to provider for review/approval because:  Drug not active on patient's medication list  Medication is reported/historical

## 2019-01-11 NOTE — TELEPHONE ENCOUNTER
Appt required. Can take 2000 international unit(s) of Vitamin D over the counter instead if desired.

## 2019-01-11 NOTE — TELEPHONE ENCOUNTER
MA - please call the patient and/or pharmacy to confirm this was truly requested or was this an automatic refill request.    Mickie Salgado RN - [facility]

## 2019-01-15 NOTE — TELEPHONE ENCOUNTER
Called pt via [employer] . Daughter answered and she speaks English. Consent to communicate is on file. Gave her provider's message. She will call back to schedule.    Claribel Staples RN  [facility]

## 2020-03-30 ENCOUNTER — OFFICE VISIT (OUTPATIENT)
Dept: URGENT CARE | Facility: URGENT CARE | Age: 65
End: 2020-03-30
Payer: COMMERCIAL

## 2020-03-30 VITALS
HEART RATE: 88 BPM | OXYGEN SATURATION: 98 % | WEIGHT: 162.6 LBS | SYSTOLIC BLOOD PRESSURE: 171 MMHG | BODY MASS INDEX: 26.45 KG/M2 | RESPIRATION RATE: 16 BRPM | DIASTOLIC BLOOD PRESSURE: 94 MMHG | TEMPERATURE: 99.4 F

## 2020-03-30 DIAGNOSIS — G89.29 CHRONIC BILATERAL LOW BACK PAIN WITHOUT SCIATICA: ICD-10-CM

## 2020-03-30 DIAGNOSIS — R42 DIZZINESS: Primary | ICD-10-CM

## 2020-03-30 DIAGNOSIS — R51.9 HEADACHE, UNSPECIFIED HEADACHE TYPE: ICD-10-CM

## 2020-03-30 DIAGNOSIS — M62.81 MUSCLE WEAKNESS (GENERALIZED): ICD-10-CM

## 2020-03-30 DIAGNOSIS — M54.50 CHRONIC BILATERAL LOW BACK PAIN WITHOUT SCIATICA: ICD-10-CM

## 2020-03-30 DIAGNOSIS — I10 HYPERTENSION GOAL BP (BLOOD PRESSURE) < 150/90: ICD-10-CM

## 2020-03-30 PROCEDURE — 99215 OFFICE O/P EST HI 40 MIN: CPT | Performed by: PHYSICIAN ASSISTANT

## 2020-03-30 ASSESSMENT — ENCOUNTER SYMPTOMS
FACIAL ASYMMETRY: 0
HEADACHES: 1
FLANK PAIN: 0
CHILLS: 0
COUGH: 0
CONSTIPATION: 0
PARESTHESIAS: 0
SINUS PRESSURE: 0
FREQUENCY: 1
RHINORRHEA: 0
NAUSEA: 1
SEIZURES: 0
RESPIRATORY NEGATIVE: 1
DIZZINESS: 1
NUMBNESS: 0
TREMORS: 0
WHEEZING: 0
JOINT SWELLING: 0
SINUS PAIN: 0
SPEECH DIFFICULTY: 0
NECK STIFFNESS: 0
FATIGUE: 1
VOMITING: 0
DIARRHEA: 0
WEAKNESS: 1
DYSURIA: 0
HEMATURIA: 0
NECK PAIN: 0
SORE THROAT: 0
FEVER: 0
SHORTNESS OF BREATH: 0
BACK PAIN: 1
PALPITATIONS: 0
HEMATOCHEZIA: 0
ABDOMINAL PAIN: 0
CARDIOVASCULAR NEGATIVE: 1

## 2020-03-30 NOTE — PROGRESS NOTES
Subjective   Isa Stanton is a 65 year old female who presents to clinic today with daughter who interprets for patient for the following health issues:  HPI   Dizziness and HA    Duration: today    Description   Feeling faint:  YES  Feeling like the surroundings are moving: no   Loss of consciousness or falls: no     Intensity:  moderate    Accompanying signs and symptoms:   Nausea/vomitting: YES  Palpitations: No chest pain, SOB, palpitations, orthopnea, PND or peripheral edema.  No one sided weakness or slurred speech.    Weakness in arms or legs: YES  Vision or speech changes: YES  Ringing in ears (Tinnitus): no   Hearing loss related to dizziness: no   Other (fevers/chills/sweating/dyspnea):  Also reports LBP but no radicular pain, numbness, tingling.  No bladder or bowel dysfunction.  No URI symptoms or fevers.  No abdominal pain, constipation, diarrhea, bloody or black tarry stools.     History (similar episodes/head trauma/previous evaluation/recent bleeding): None.  Hx of hypertension with BP elevated today in clinic.    Precipitating or alleviating factors (new meds/chemicals): None  Worse with activity/head movement: YES- but present all the time    Therapies tried and outcome: rest, fluids, BP med with minimal relief      Patient Active Problem List   Diagnosis     Advanced directives, counseling/discussion     CARDIOVASCULAR SCREENING; LDL GOAL LESS THAN 160     Osteoarthritis of knees, bilateral     Vitamin D deficiency     Neck pain     Abdominal pain, generalized     Hypertension goal BP (blood pressure) < 150/90     Combined form of age-related cataract, both eyes     Pterygium of eye, left     Dermatochalasis of both upper eyelids     Right ovarian cyst     Past Surgical History:   Procedure Laterality Date     NO HISTORY OF SURGERY         Social History     Tobacco Use     Smoking status: Never Smoker     Smokeless tobacco: Never Used   Substance Use Topics     Alcohol use: No     Alcohol/week:  0.0 standard drinks     Family History   Problem Relation Age of Onset     Diabetes No family hx of      Heart Disease No family hx of      Cancer No family hx of          Current Outpatient Medications   Medication Sig Dispense Refill     hydrochlorothiazide (HYDRODIURIL) 25 MG tablet TAKE 1 TABLET BY MOUTH DAILY FOR BLOOD PRESSURE 90 tablet 3     No Known Allergies    Reviewed and updated as needed this visit by Provider       Review of Systems   Constitutional: Positive for fatigue. Negative for chills and fever.   HENT: Negative for congestion, ear discharge, ear pain, hearing loss, rhinorrhea, sinus pressure, sinus pain and sore throat.    Eyes: Positive for visual disturbance.   Respiratory: Negative.  Negative for cough, shortness of breath and wheezing.    Cardiovascular: Negative.  Negative for chest pain, palpitations and peripheral edema.   Gastrointestinal: Positive for nausea. Negative for abdominal pain, constipation, diarrhea, hematochezia and vomiting.   Genitourinary: Positive for frequency. Negative for dysuria, flank pain, hematuria, urgency, vaginal bleeding and vaginal discharge.   Musculoskeletal: Positive for back pain. Negative for gait problem, joint swelling, neck pain and neck stiffness.   Neurological: Positive for dizziness, weakness and headaches. Negative for tremors, seizures, syncope, facial asymmetry, speech difficulty, numbness and paresthesias.   All other systems reviewed and are negative.           Objective    BP (!) 171/94   Pulse 88   Temp 99.4  F (37.4  C) (Oral)   Resp 16   Wt 73.8 kg (162 lb 9.6 oz)   SpO2 98%   BMI 26.45 kg/m    Body mass index is 26.45 kg/m .  Physical Exam  Vitals signs and nursing note reviewed.   Constitutional:       General: She is not in acute distress.     Appearance: Normal appearance. She is well-developed. She is obese. She is not ill-appearing.   HENT:      Head: Normocephalic and atraumatic.      Ears:      Comments: TMs are intact  without any erythema or bulging bilaterally.  Airway is patent.     Nose: Nose normal.      Mouth/Throat:      Lips: Pink.      Mouth: Mucous membranes are moist.      Pharynx: Oropharynx is clear. Uvula midline. No pharyngeal swelling, oropharyngeal exudate or posterior oropharyngeal erythema.      Tonsils: No tonsillar exudate.   Eyes:      General: No scleral icterus.     Extraocular Movements: Extraocular movements intact.      Conjunctiva/sclera: Conjunctivae normal.      Pupils: Pupils are equal, round, and reactive to light.   Neck:      Musculoskeletal: Normal range of motion and neck supple.      Thyroid: No thyromegaly.      Meningeal: Brudzinski's sign and Kernig's sign absent.   Cardiovascular:      Rate and Rhythm: Normal rate and regular rhythm.      Pulses: Normal pulses.      Heart sounds: Normal heart sounds, S1 normal and S2 normal. No murmur. No friction rub. No gallop.    Pulmonary:      Effort: Pulmonary effort is normal. No accessory muscle usage, respiratory distress or retractions.      Breath sounds: Normal breath sounds and air entry. No decreased breath sounds, wheezing, rhonchi or rales.   Musculoskeletal:      Lumbar back: She exhibits decreased range of motion, tenderness and spasm. She exhibits no bony tenderness, no swelling, no edema, no deformity, no laceration and normal pulse.   Lymphadenopathy:      Cervical: No cervical adenopathy.   Skin:     General: Skin is warm and dry.      Capillary Refill: Capillary refill takes less than 2 seconds.      Findings: No rash.      Comments: Distal pulses are 2+ and symmetric.  No peripheral edema.   Neurological:      General: No focal deficit present.      Mental Status: She is alert and oriented to person, place, and time.      GCS: GCS eye subscore is 4. GCS verbal subscore is 5. GCS motor subscore is 6.      Cranial Nerves: Cranial nerves are intact. No cranial nerve deficit, dysarthria or facial asymmetry.      Sensory: Sensation is  intact. No sensory deficit.      Motor: Motor function is intact.      Coordination: Coordination is intact. Romberg sign negative. Coordination normal. Finger-Nose-Finger Test normal.      Gait: Gait is intact. Gait normal.      Deep Tendon Reflexes: Reflexes are normal and symmetric.   Psychiatric:         Mood and Affect: Mood normal.         Behavior: Behavior normal.         Thought Content: Thought content normal.         Judgment: Judgment normal.              Assessment & Plan   Dizziness:  Along with HA, weakness, elevated BP and LBP.  H&P is concerning for CVA vs vertigo vs cardiac cause vs metabolic/infectious cause.  No focal neurologic deficits.  Recommend further evaluation and management in the ER.  Will most likely need further workup with labs and/or imaging.  Patient has declined transportation via ambulance and will have family drive her.  Understands risks and benefits of ambulance transfer and she has declined.  Call 911 if worsening symptoms.  She plans to go to [place] ER.  She left in stable condition with AVS in hand.  F/u with PCP after ER visit.     Headache, unspecified headache type    Muscle weakness (generalized)    Hypertension goal BP (blood pressure) < 150/90    Chronic bilateral low back pain without sciatica           Erin See CORBIN Franz  [facility]

## 2020-05-12 NOTE — PROGRESS NOTES
Subjective:    Patient is a 62 year old female presenting with rehab cervical spine hpi.   Isa Stanton is a 62 year old female with a cervical spine condition.  Condition occurred with:  Insidious onset.  Condition occurred: for unknown reasons.  This is a chronic condition  Neck pain started about 10/16. She just woke up with the pain and was hoping overtime it would go away but it has not.  Also has headaches at certain time with the pain..    Patient reports pain:  Cervical right side and cervical left side.  Radiates to:  Shoulder left.  Pain is described as aching and other (pulling) and is intermittent and reported as 5/10.  Associated symptoms:  Headache. Pain is the same all the time.  Symptoms are exacerbated by rotating head and lying down and relieved by muscle relaxants.  Since onset symptoms are unchanged.        General health as reported by patient is good.  Pertinent medical history includes:  High blood pressure.  Medical allergies: no.  Other surgeries include:  No.  Current medications:  None as reported by the patient.  Current occupation is None  .        Barriers include:  None as reported by the patient.    Red flags:  None as reported by the patient.                        Objective:    System    Physical Exam    Anuradha Cervical Evaluation    Posture:  Sitting: fair  Standing: good  Protruding Head: yes  Wry Neck: nil      Movement Loss:    Flexion (Flex): mod and pain  Retraction (RET): mod and pain  Extension (EXT): mod, min and pain  Lateral Flexion Right (LF R): mod and pain  Lateral Flexion Left (LF L): mod and pain  Rotation Right (ROT R): mod, major and pain  Rotation Left (ROT L): mod, major and pain  Test Movements:      RET: During: increases  After: no worse  Mechanical Response: no effect  Repeat RET: During: increases  After: no effect  Mechanical Response: IncROM                          Conclusion: derangement  Principle of Treatment:  Posture Correction: with lumbar  roll    Extension: Retraction 10x 6-8x/day                                             ROS    Assessment/Plan:      Patient is a 62 year old female with cervical complaints.    Patient has the following significant findings with corresponding treatment plan.                Diagnosis 1:  Neck pain  Pain -  hot/cold therapy, electric stimulation, manual therapy, self management, education and directional preference exercise  Decreased ROM/flexibility - manual therapy and therapeutic exercise  Impaired muscle performance - neuro re-education  Decreased function - therapeutic activities  Impaired posture - neuro re-education    Therapy Evaluation Codes:   1) History comprised of:   Personal factors that impact the plan of care:      Language.    Comorbidity factors that impact the plan of care are:      High blood pressure.     Medications impacting care: None.  2) Examination of Body Systems comprised of:   Body structures and functions that impact the plan of care:      Cervical spine.   Activity limitations that impact the plan of care are:      Sitting.  3) Clinical presentation characteristics are:   Stable/Uncomplicated.  4) Decision-Making    Low complexity using standardized patient assessment instrument and/or measureable assessment of functional outcome.  Cumulative Therapy Evaluation is: Low complexity.    Previous and current functional limitations:  (See Goal Flow Sheet for this information)    Short term and Long term goals: (See Goal Flow Sheet for this information)     Communication ability:  Patient appears to be able to clearly communicate and understand verbal and written communication and follow directions correctly.  Treatment Explanation - The following has been discussed with the patient:   RX ordered/plan of care  Anticipated outcomes  Possible risks and side effects  This patient would benefit from PT intervention to resume normal activities.   Rehab potential is good.    Frequency:  1 X week,  once daily  Duration:  for 8 weeks  Discharge Plan:  Achieve all LTG.  Independent in home treatment program.  Reach maximal therapeutic benefit.    Please refer to the daily flowsheet for treatment today, total treatment time and time spent performing 1:1 timed codes.            Principal Discharge DX:	Homeless single person

## 2020-07-09 ENCOUNTER — OFFICE VISIT (OUTPATIENT)
Dept: URGENT CARE | Facility: URGENT CARE | Age: 65
End: 2020-07-09
Payer: COMMERCIAL

## 2020-07-09 VITALS
TEMPERATURE: 100.3 F | WEIGHT: 167.2 LBS | DIASTOLIC BLOOD PRESSURE: 84 MMHG | BODY MASS INDEX: 27.19 KG/M2 | HEART RATE: 96 BPM | SYSTOLIC BLOOD PRESSURE: 138 MMHG | OXYGEN SATURATION: 99 % | RESPIRATION RATE: 20 BRPM

## 2020-07-09 DIAGNOSIS — K29.00 ACUTE GASTRITIS WITHOUT HEMORRHAGE, UNSPECIFIED GASTRITIS TYPE: Primary | ICD-10-CM

## 2020-07-09 PROCEDURE — 93000 ELECTROCARDIOGRAM COMPLETE: CPT | Performed by: FAMILY MEDICINE

## 2020-07-09 PROCEDURE — 99214 OFFICE O/P EST MOD 30 MIN: CPT | Performed by: FAMILY MEDICINE

## 2020-07-09 RX ORDER — ACETAMINOPHEN 325 MG/1
325-650 TABLET ORAL
COMMUNITY
Start: 2020-01-28

## 2020-07-09 ASSESSMENT — ENCOUNTER SYMPTOMS
DIARRHEA: 0
FEVER: 0
NAUSEA: 0
HEADACHES: 0
VOMITING: 0
CHILLS: 0
SHORTNESS OF BREATH: 0
RHINORRHEA: 0
WOUND: 0
COUGH: 0
SORE THROAT: 0
FATIGUE: 1

## 2020-07-09 ASSESSMENT — PAIN SCALES - GENERAL: PAINLEVEL: SEVERE PAIN (7)

## 2020-07-09 NOTE — PROGRESS NOTES
SUBJECTIVE:   Isa Stanton is a 65 year old female presenting with a chief complaint of   Chief Complaint   Patient presents with     Abdominal Pain     Feels full all the time, feels like food is not digesting, feels pain in epigastic area and body aches x4 days     65-year-old female presenting today with 4 days of indigestion body aches epigastric area discomfort and indigestion.  She does not have cough.      Review of Systems   Constitutional: Positive for fatigue. Negative for chills and fever.   HENT: Negative for congestion, ear pain, rhinorrhea and sore throat.    Respiratory: Negative for cough and shortness of breath.    Gastrointestinal: Negative for diarrhea, nausea and vomiting.   Skin: Negative for rash and wound.   Neurological: Negative for headaches.       Past Medical History:   Diagnosis Date     Chronic abdominal pain      H. pylori infection      HTN (hypertension)      Lung nodules      Osteoarthritis of knees, bilateral      Vitamin D deficiency      Family History   Problem Relation Age of Onset     Diabetes No family hx of      Heart Disease No family hx of      Cancer No family hx of      Current Outpatient Medications   Medication Sig Dispense Refill     acetaminophen (TYLENOL) 325 MG tablet Take 325-650 mg by mouth       hydrochlorothiazide (HYDRODIURIL) 25 MG tablet TAKE 1 TABLET BY MOUTH DAILY FOR BLOOD PRESSURE 90 tablet 3     Social History     Tobacco Use     Smoking status: Never Smoker     Smokeless tobacco: Never Used   Substance Use Topics     Alcohol use: No     Alcohol/week: 0.0 standard drinks       OBJECTIVE  /84   Pulse 96   Temp 100.3  F (37.9  C) (Oral)   Resp 20   Wt 75.8 kg (167 lb 3.2 oz)   SpO2 99%   BMI 27.19 kg/m      Physical Exam  HENT:      Head: Normocephalic and atraumatic.      Right Ear: External ear normal.      Left Ear: External ear normal.      Nose: Nose normal.      Mouth/Throat:      Pharynx: No oropharyngeal exudate.   Eyes:      General:  No scleral icterus.        Right eye: No discharge.         Left eye: No discharge.      Conjunctiva/sclera: Conjunctivae normal.      Pupils: Pupils are equal, round, and reactive to light.   Neck:      Musculoskeletal: Neck supple.      Thyroid: No thyromegaly.      Trachea: No tracheal deviation.   Cardiovascular:      Rate and Rhythm: Normal rate and regular rhythm.      Heart sounds: Normal heart sounds. No murmur. No friction rub. No gallop.    Pulmonary:      Effort: Pulmonary effort is normal. No respiratory distress.      Breath sounds: Normal breath sounds. No stridor. No wheezing or rales.   Chest:      Chest wall: No tenderness.   Abdominal:      General: Bowel sounds are normal. There is no distension.      Palpations: Abdomen is soft. There is no mass.      Tenderness: There is no abdominal tenderness. There is no guarding or rebound.   Musculoskeletal:         General: No tenderness or deformity.   Lymphadenopathy:      Cervical: No cervical adenopathy.   Skin:     General: Skin is warm and dry.      Findings: No erythema or rash.   Neurological:      Mental Status: She is alert and oriented to person, place, and time.      Cranial Nerves: No cranial nerve deficit.   Psychiatric:         Judgment: Judgment normal.       EKG WNL RRR   ASSESSMENT:    ICD-10-CM    1. Chest pain, unspecified  R07.9 EKG 12-lead complete w/read - Clinics   2. Acute gastritis without hemorrhage, unspecified gastritis type  K29.00 omeprazole (PRILOSEC) 20 MG DR capsule            PLAN:  EKG was reassuring I did emphasize importance of immediate follow-up to the ER for any worsening symptoms I think her exam is most consistent with epigastric discomfort although I mentioned the urgent care is not the best place to rule out chest pain.  He may consider direct trip to the ER for any additional pain she is having  May require further troponin testing.  Omeprazole was provided for presumptive gastritis.